# Patient Record
Sex: MALE | Race: WHITE | Employment: OTHER | URBAN - NONMETROPOLITAN AREA
[De-identification: names, ages, dates, MRNs, and addresses within clinical notes are randomized per-mention and may not be internally consistent; named-entity substitution may affect disease eponyms.]

---

## 2018-04-06 ENCOUNTER — HOSPITAL ENCOUNTER (EMERGENCY)
Age: 78
Discharge: HOME | End: 2018-04-06
Attending: EMERGENCY MEDICINE
Payer: MEDICARE

## 2018-04-06 VITALS
HEIGHT: 69 IN | RESPIRATION RATE: 18 BRPM | DIASTOLIC BLOOD PRESSURE: 76 MMHG | HEART RATE: 92 BPM | OXYGEN SATURATION: 98 % | WEIGHT: 264 LBS | TEMPERATURE: 98.5 F | BODY MASS INDEX: 39.1 KG/M2 | SYSTOLIC BLOOD PRESSURE: 132 MMHG

## 2018-04-06 DIAGNOSIS — J01.00 ACUTE NON-RECURRENT MAXILLARY SINUSITIS: Primary | ICD-10-CM

## 2018-04-06 DIAGNOSIS — J06.9 ACUTE UPPER RESPIRATORY INFECTION: ICD-10-CM

## 2018-04-06 PROCEDURE — 99282 EMERGENCY DEPT VISIT SF MDM: CPT

## 2018-04-06 RX ORDER — AMOXICILLIN AND CLAVULANATE POTASSIUM 875; 125 MG/1; MG/1
1 TABLET, FILM COATED ORAL 2 TIMES DAILY
Qty: 14 TABLET | Refills: 0 | Status: SHIPPED | OUTPATIENT
Start: 2018-04-06 | End: 2018-04-13

## 2018-04-06 ASSESSMENT — PAIN DESCRIPTION - FREQUENCY
FREQUENCY: CONTINUOUS
FREQUENCY: INTERMITTENT

## 2018-04-06 ASSESSMENT — PAIN DESCRIPTION - DESCRIPTORS
DESCRIPTORS: ACHING
DESCRIPTORS: DISCOMFORT;HEADACHE;SORE

## 2018-04-06 ASSESSMENT — PAIN DESCRIPTION - LOCATION
LOCATION: HEAD;THROAT
LOCATION: HEAD

## 2018-04-06 ASSESSMENT — ENCOUNTER SYMPTOMS
RHINORRHEA: 1
WHEEZING: 0
SORE THROAT: 0
SINUS PAIN: 1
SWOLLEN GLANDS: 0
COUGH: 0

## 2018-04-06 ASSESSMENT — PAIN DESCRIPTION - PROGRESSION
CLINICAL_PROGRESSION: GRADUALLY IMPROVING
CLINICAL_PROGRESSION: NOT CHANGED

## 2018-04-06 ASSESSMENT — PAIN DESCRIPTION - ONSET
ONSET: GRADUAL
ONSET: ON-GOING

## 2018-04-06 ASSESSMENT — PAIN DESCRIPTION - PAIN TYPE
TYPE: ACUTE PAIN
TYPE: ACUTE PAIN

## 2018-04-06 ASSESSMENT — PAIN SCALES - GENERAL
PAINLEVEL_OUTOF10: 4
PAINLEVEL_OUTOF10: 6

## 2018-04-06 ASSESSMENT — PAIN DESCRIPTION - ORIENTATION: ORIENTATION: ANTERIOR

## 2023-11-03 ENCOUNTER — APPOINTMENT (OUTPATIENT)
Dept: RADIOLOGY | Facility: HOSPITAL | Age: 83
End: 2023-11-03
Payer: MEDICARE

## 2023-11-27 ENCOUNTER — APPOINTMENT (OUTPATIENT)
Dept: RADIOLOGY | Facility: HOSPITAL | Age: 83
End: 2023-11-27
Payer: MEDICARE

## 2023-12-26 ENCOUNTER — APPOINTMENT (OUTPATIENT)
Dept: CARDIOLOGY | Facility: HOSPITAL | Age: 83
DRG: 189 | End: 2023-12-26
Payer: MEDICARE

## 2023-12-26 ENCOUNTER — HOSPITAL ENCOUNTER (OUTPATIENT)
Facility: HOSPITAL | Age: 83
Discharge: HOME | DRG: 189 | End: 2023-12-27
Attending: INTERNAL MEDICINE | Admitting: INTERNAL MEDICINE
Payer: MEDICARE

## 2023-12-26 DIAGNOSIS — I35.0 MODERATE AORTIC STENOSIS: ICD-10-CM

## 2023-12-26 DIAGNOSIS — I50.9 DECOMPENSATED HEART FAILURE (MULTI): Primary | ICD-10-CM

## 2023-12-26 PROBLEM — B33.8 RSV (RESPIRATORY SYNCYTIAL VIRUS INFECTION): Status: ACTIVE | Noted: 2023-12-26

## 2023-12-26 PROBLEM — J96.01 ACUTE HYPOXIC RESPIRATORY FAILURE (MULTI): Status: ACTIVE | Noted: 2023-12-26

## 2023-12-26 LAB — GLUCOSE BLD MANUAL STRIP-MCNC: 137 MG/DL (ref 74–99)

## 2023-12-26 PROCEDURE — 82947 ASSAY GLUCOSE BLOOD QUANT: CPT

## 2023-12-26 PROCEDURE — 99223 1ST HOSP IP/OBS HIGH 75: CPT | Performed by: NURSE PRACTITIONER

## 2023-12-26 PROCEDURE — 1200000002 HC GENERAL ROOM WITH TELEMETRY DAILY

## 2023-12-26 PROCEDURE — 2500000001 HC RX 250 WO HCPCS SELF ADMINISTERED DRUGS (ALT 637 FOR MEDICARE OP): Performed by: NURSE PRACTITIONER

## 2023-12-26 PROCEDURE — 93005 ELECTROCARDIOGRAM TRACING: CPT

## 2023-12-26 RX ORDER — DEXTROSE 50 % IN WATER (D50W) INTRAVENOUS SYRINGE
25
Status: DISCONTINUED | OUTPATIENT
Start: 2023-12-26 | End: 2023-12-27 | Stop reason: HOSPADM

## 2023-12-26 RX ORDER — GUAIFENESIN 600 MG/1
1200 TABLET, EXTENDED RELEASE ORAL DAILY
Status: DISCONTINUED | OUTPATIENT
Start: 2023-12-27 | End: 2023-12-27 | Stop reason: HOSPADM

## 2023-12-26 RX ORDER — PANTOPRAZOLE SODIUM 20 MG/1
20 TABLET, DELAYED RELEASE ORAL
Status: DISCONTINUED | OUTPATIENT
Start: 2023-12-27 | End: 2023-12-27 | Stop reason: HOSPADM

## 2023-12-26 RX ORDER — IPRATROPIUM BROMIDE AND ALBUTEROL SULFATE 2.5; .5 MG/3ML; MG/3ML
3 SOLUTION RESPIRATORY (INHALATION) 3 TIMES DAILY
Status: DISCONTINUED | OUTPATIENT
Start: 2023-12-26 | End: 2023-12-26

## 2023-12-26 RX ORDER — DEXTROSE MONOHYDRATE 100 MG/ML
0.3 INJECTION, SOLUTION INTRAVENOUS ONCE AS NEEDED
Status: DISCONTINUED | OUTPATIENT
Start: 2023-12-26 | End: 2023-12-27 | Stop reason: HOSPADM

## 2023-12-26 RX ORDER — IPRATROPIUM BROMIDE AND ALBUTEROL SULFATE 2.5; .5 MG/3ML; MG/3ML
3 SOLUTION RESPIRATORY (INHALATION)
Status: DISCONTINUED | OUTPATIENT
Start: 2023-12-27 | End: 2023-12-27 | Stop reason: HOSPADM

## 2023-12-26 RX ORDER — METOPROLOL TARTRATE 25 MG/1
25 TABLET, FILM COATED ORAL DAILY
Status: DISCONTINUED | OUTPATIENT
Start: 2023-12-27 | End: 2023-12-27 | Stop reason: HOSPADM

## 2023-12-26 RX ORDER — FUROSEMIDE 40 MG/1
40 TABLET ORAL DAILY
Status: DISCONTINUED | OUTPATIENT
Start: 2023-12-27 | End: 2023-12-27 | Stop reason: HOSPADM

## 2023-12-26 RX ORDER — ACETAMINOPHEN 325 MG/1
650 TABLET ORAL EVERY 6 HOURS PRN
Status: DISCONTINUED | OUTPATIENT
Start: 2023-12-26 | End: 2023-12-27 | Stop reason: HOSPADM

## 2023-12-26 RX ORDER — WARFARIN 3 MG/1
6 TABLET ORAL DAILY
Status: DISCONTINUED | OUTPATIENT
Start: 2023-12-27 | End: 2023-12-27 | Stop reason: HOSPADM

## 2023-12-26 RX ORDER — DOCUSATE SODIUM 100 MG/1
100 CAPSULE, LIQUID FILLED ORAL 2 TIMES DAILY
Status: DISCONTINUED | OUTPATIENT
Start: 2023-12-26 | End: 2023-12-27 | Stop reason: HOSPADM

## 2023-12-26 RX ORDER — CALCIUM CARBONATE 300MG(750)
400 TABLET,CHEWABLE ORAL DAILY
COMMUNITY

## 2023-12-26 RX ORDER — ALBUTEROL SULFATE 0.83 MG/ML
2.5 SOLUTION RESPIRATORY (INHALATION) EVERY 6 HOURS PRN
Status: DISCONTINUED | OUTPATIENT
Start: 2023-12-26 | End: 2023-12-26

## 2023-12-26 RX ORDER — ALBUTEROL SULFATE 0.83 MG/ML
2.5 SOLUTION RESPIRATORY (INHALATION) EVERY 2 HOUR PRN
Status: DISCONTINUED | OUTPATIENT
Start: 2023-12-26 | End: 2023-12-27 | Stop reason: HOSPADM

## 2023-12-26 RX ORDER — WARFARIN 6 MG/1
6 TABLET ORAL
COMMUNITY
End: 2024-01-19 | Stop reason: ENTERED-IN-ERROR

## 2023-12-26 RX ORDER — OMEPRAZOLE 40 MG/1
20 CAPSULE, DELAYED RELEASE ORAL
COMMUNITY

## 2023-12-26 RX ORDER — METFORMIN HYDROCHLORIDE EXTENDED-RELEASE TABLETS 500 MG/1
500 TABLET, FILM COATED, EXTENDED RELEASE ORAL
COMMUNITY

## 2023-12-26 RX ORDER — WARFARIN 3 MG/1
9 TABLET ORAL ONCE
Status: DISCONTINUED | OUTPATIENT
Start: 2023-12-26 | End: 2023-12-26

## 2023-12-26 RX ORDER — METOPROLOL TARTRATE 25 MG/1
25 TABLET, FILM COATED ORAL DAILY
COMMUNITY

## 2023-12-26 RX ORDER — INSULIN LISPRO 100 [IU]/ML
0-5 INJECTION, SOLUTION INTRAVENOUS; SUBCUTANEOUS 4 TIMES DAILY
Status: DISCONTINUED | OUTPATIENT
Start: 2023-12-26 | End: 2023-12-27 | Stop reason: HOSPADM

## 2023-12-26 RX ORDER — FUROSEMIDE 40 MG/1
40 TABLET ORAL DAILY
COMMUNITY

## 2023-12-26 RX ORDER — LANOLIN ALCOHOL/MO/W.PET/CERES
400 CREAM (GRAM) TOPICAL DAILY
Status: DISCONTINUED | OUTPATIENT
Start: 2023-12-27 | End: 2023-12-27 | Stop reason: HOSPADM

## 2023-12-26 RX ADMIN — DOCUSATE SODIUM 100 MG: 100 CAPSULE, LIQUID FILLED ORAL at 21:53

## 2023-12-26 SDOH — ECONOMIC STABILITY: INCOME INSECURITY: HOW HARD IS IT FOR YOU TO PAY FOR THE VERY BASICS LIKE FOOD, HOUSING, MEDICAL CARE, AND HEATING?: NOT HARD AT ALL

## 2023-12-26 SDOH — ECONOMIC STABILITY: HOUSING INSECURITY
IN THE LAST 12 MONTHS, WAS THERE A TIME WHEN YOU DID NOT HAVE A STEADY PLACE TO SLEEP OR SLEPT IN A SHELTER (INCLUDING NOW)?: NO

## 2023-12-26 SDOH — SOCIAL STABILITY: SOCIAL INSECURITY: HAVE YOU HAD THOUGHTS OF HARMING ANYONE ELSE?: NO

## 2023-12-26 SDOH — SOCIAL STABILITY: SOCIAL INSECURITY: WERE YOU ABLE TO COMPLETE ALL THE BEHAVIORAL HEALTH SCREENINGS?: YES

## 2023-12-26 SDOH — SOCIAL STABILITY: SOCIAL INSECURITY: ABUSE: ADULT

## 2023-12-26 SDOH — HEALTH STABILITY: MENTAL HEALTH: HOW OFTEN DO YOU HAVE 6 OR MORE DRINKS ON ONE OCCASION?: NEVER

## 2023-12-26 SDOH — SOCIAL STABILITY: SOCIAL INSECURITY: ARE THERE ANY APPARENT SIGNS OF INJURIES/BEHAVIORS THAT COULD BE RELATED TO ABUSE/NEGLECT?: NO

## 2023-12-26 SDOH — HEALTH STABILITY: MENTAL HEALTH: HOW MANY STANDARD DRINKS CONTAINING ALCOHOL DO YOU HAVE ON A TYPICAL DAY?: PATIENT DOES NOT DRINK

## 2023-12-26 SDOH — SOCIAL STABILITY: SOCIAL INSECURITY: DO YOU FEEL UNSAFE GOING BACK TO THE PLACE WHERE YOU ARE LIVING?: NO

## 2023-12-26 SDOH — ECONOMIC STABILITY: INCOME INSECURITY: IN THE LAST 12 MONTHS, WAS THERE A TIME WHEN YOU WERE NOT ABLE TO PAY THE MORTGAGE OR RENT ON TIME?: NO

## 2023-12-26 SDOH — ECONOMIC STABILITY: TRANSPORTATION INSECURITY
IN THE PAST 12 MONTHS, HAS LACK OF TRANSPORTATION KEPT YOU FROM MEETINGS, WORK, OR FROM GETTING THINGS NEEDED FOR DAILY LIVING?: NO

## 2023-12-26 SDOH — SOCIAL STABILITY: SOCIAL INSECURITY: DOES ANYONE TRY TO KEEP YOU FROM HAVING/CONTACTING OTHER FRIENDS OR DOING THINGS OUTSIDE YOUR HOME?: NO

## 2023-12-26 SDOH — ECONOMIC STABILITY: TRANSPORTATION INSECURITY
IN THE PAST 12 MONTHS, HAS THE LACK OF TRANSPORTATION KEPT YOU FROM MEDICAL APPOINTMENTS OR FROM GETTING MEDICATIONS?: NO

## 2023-12-26 SDOH — SOCIAL STABILITY: SOCIAL INSECURITY: HAS ANYONE EVER THREATENED TO HURT YOUR FAMILY OR YOUR PETS?: NO

## 2023-12-26 SDOH — HEALTH STABILITY: MENTAL HEALTH: HOW OFTEN DO YOU HAVE A DRINK CONTAINING ALCOHOL?: NEVER

## 2023-12-26 SDOH — SOCIAL STABILITY: SOCIAL INSECURITY: DO YOU FEEL ANYONE HAS EXPLOITED OR TAKEN ADVANTAGE OF YOU FINANCIALLY OR OF YOUR PERSONAL PROPERTY?: NO

## 2023-12-26 SDOH — SOCIAL STABILITY: SOCIAL INSECURITY: ARE YOU OR HAVE YOU BEEN THREATENED OR ABUSED PHYSICALLY, EMOTIONALLY, OR SEXUALLY BY ANYONE?: NO

## 2023-12-26 SDOH — ECONOMIC STABILITY: HOUSING INSECURITY: IN THE LAST 12 MONTHS, HOW MANY PLACES HAVE YOU LIVED?: 1

## 2023-12-26 ASSESSMENT — PAIN - FUNCTIONAL ASSESSMENT: PAIN_FUNCTIONAL_ASSESSMENT: 0-10

## 2023-12-26 ASSESSMENT — COGNITIVE AND FUNCTIONAL STATUS - GENERAL
PATIENT BASELINE BEDBOUND: NO
MOBILITY SCORE: 23
DAILY ACTIVITIY SCORE: 24
CLIMB 3 TO 5 STEPS WITH RAILING: A LITTLE

## 2023-12-26 ASSESSMENT — ENCOUNTER SYMPTOMS
HEADACHES: 0
SORE THROAT: 0
WOUND: 0
RHINORRHEA: 0
CONSTIPATION: 0
CHILLS: 1
MYALGIAS: 1
ACTIVITY CHANGE: 0
CONFUSION: 0
WHEEZING: 1
DIZZINESS: 0
BACK PAIN: 0
ABDOMINAL PAIN: 0
EYE ITCHING: 0
SHORTNESS OF BREATH: 1
FEVER: 0
AGITATION: 0
ARTHRALGIAS: 1
POLYDIPSIA: 0
APPETITE CHANGE: 0
DYSURIA: 0
EYE REDNESS: 0

## 2023-12-26 ASSESSMENT — ACTIVITIES OF DAILY LIVING (ADL)
BATHING: INDEPENDENT
ADEQUATE_TO_COMPLETE_ADL: NO
HEARING - RIGHT EAR: DIFFICULTY WITH NOISE
DRESSING YOURSELF: INDEPENDENT
WALKS IN HOME: INDEPENDENT
PATIENT'S MEMORY ADEQUATE TO SAFELY COMPLETE DAILY ACTIVITIES?: NO
FEEDING YOURSELF: INDEPENDENT
HEARING - LEFT EAR: DIFFICULTY WITH NOISE
TOILETING: INDEPENDENT
GROOMING: INDEPENDENT
LACK_OF_TRANSPORTATION: NO
JUDGMENT_ADEQUATE_SAFELY_COMPLETE_DAILY_ACTIVITIES: NO

## 2023-12-26 ASSESSMENT — COLUMBIA-SUICIDE SEVERITY RATING SCALE - C-SSRS
6. HAVE YOU EVER DONE ANYTHING, STARTED TO DO ANYTHING, OR PREPARED TO DO ANYTHING TO END YOUR LIFE?: NO
2. HAVE YOU ACTUALLY HAD ANY THOUGHTS OF KILLING YOURSELF?: NO
1. IN THE PAST MONTH, HAVE YOU WISHED YOU WERE DEAD OR WISHED YOU COULD GO TO SLEEP AND NOT WAKE UP?: NO

## 2023-12-26 ASSESSMENT — LIFESTYLE VARIABLES
HOW OFTEN DO YOU HAVE 6 OR MORE DRINKS ON ONE OCCASION: NEVER
HOW MANY STANDARD DRINKS CONTAINING ALCOHOL DO YOU HAVE ON A TYPICAL DAY: PATIENT DOES NOT DRINK
SKIP TO QUESTIONS 9-10: 1
AUDIT-C TOTAL SCORE: 0
SKIP TO QUESTIONS 9-10: 1
HOW OFTEN DO YOU HAVE A DRINK CONTAINING ALCOHOL: NEVER
AUDIT-C TOTAL SCORE: 0
AUDIT-C TOTAL SCORE: 0

## 2023-12-26 ASSESSMENT — PATIENT HEALTH QUESTIONNAIRE - PHQ9
SUM OF ALL RESPONSES TO PHQ9 QUESTIONS 1 & 2: 0
2. FEELING DOWN, DEPRESSED OR HOPELESS: NOT AT ALL
1. LITTLE INTEREST OR PLEASURE IN DOING THINGS: NOT AT ALL

## 2023-12-26 ASSESSMENT — PAIN SCALES - GENERAL: PAINLEVEL_OUTOF10: 0 - NO PAIN

## 2023-12-27 VITALS
SYSTOLIC BLOOD PRESSURE: 116 MMHG | TEMPERATURE: 96.4 F | OXYGEN SATURATION: 92 % | DIASTOLIC BLOOD PRESSURE: 74 MMHG | WEIGHT: 222.44 LBS | HEIGHT: 69 IN | BODY MASS INDEX: 32.95 KG/M2 | RESPIRATION RATE: 20 BRPM | HEART RATE: 81 BPM

## 2023-12-27 LAB
ANION GAP SERPL CALC-SCNC: 10 MMOL/L (ref 10–20)
BNP SERPL-MCNC: 87 PG/ML (ref 0–99)
BUN SERPL-MCNC: 20 MG/DL (ref 6–23)
CALCIUM SERPL-MCNC: 8.5 MG/DL (ref 8.6–10.3)
CARDIAC TROPONIN I PNL SERPL HS: 16 NG/L (ref 0–20)
CHLORIDE SERPL-SCNC: 101 MMOL/L (ref 98–107)
CO2 SERPL-SCNC: 30 MMOL/L (ref 21–32)
CREAT SERPL-MCNC: 1.12 MG/DL (ref 0.5–1.3)
ERYTHROCYTE [DISTWIDTH] IN BLOOD BY AUTOMATED COUNT: 14.7 % (ref 11.5–14.5)
GFR SERPL CREATININE-BSD FRML MDRD: 65 ML/MIN/1.73M*2
GLUCOSE BLD MANUAL STRIP-MCNC: 102 MG/DL (ref 74–99)
GLUCOSE BLD MANUAL STRIP-MCNC: 80 MG/DL (ref 74–99)
GLUCOSE SERPL-MCNC: 92 MG/DL (ref 74–99)
HCT VFR BLD AUTO: 44.2 % (ref 41–52)
HGB BLD-MCNC: 14.3 G/DL (ref 13.5–17.5)
INR PPP: 1.5 (ref 0.9–1.1)
MCH RBC QN AUTO: 30.1 PG (ref 26–34)
MCHC RBC AUTO-ENTMCNC: 32.4 G/DL (ref 32–36)
MCV RBC AUTO: 93 FL (ref 80–100)
NRBC BLD-RTO: 0 /100 WBCS (ref 0–0)
PLATELET # BLD AUTO: 199 X10*3/UL (ref 150–450)
POTASSIUM SERPL-SCNC: 3.6 MMOL/L (ref 3.5–5.3)
PROTHROMBIN TIME: 16.5 SECONDS (ref 9.8–12.8)
RBC # BLD AUTO: 4.75 X10*6/UL (ref 4.5–5.9)
SODIUM SERPL-SCNC: 137 MMOL/L (ref 136–145)
WBC # BLD AUTO: 8.4 X10*3/UL (ref 4.4–11.3)

## 2023-12-27 PROCEDURE — 85610 PROTHROMBIN TIME: CPT | Performed by: NURSE PRACTITIONER

## 2023-12-27 PROCEDURE — 36415 COLL VENOUS BLD VENIPUNCTURE: CPT | Performed by: NURSE PRACTITIONER

## 2023-12-27 PROCEDURE — 94761 N-INVAS EAR/PLS OXIMETRY MLT: CPT

## 2023-12-27 PROCEDURE — 9420000001 HC RT PATIENT EDUCATION 5 MIN

## 2023-12-27 PROCEDURE — 85027 COMPLETE CBC AUTOMATED: CPT | Performed by: NURSE PRACTITIONER

## 2023-12-27 PROCEDURE — 80048 BASIC METABOLIC PNL TOTAL CA: CPT | Performed by: NURSE PRACTITIONER

## 2023-12-27 PROCEDURE — 94640 AIRWAY INHALATION TREATMENT: CPT

## 2023-12-27 PROCEDURE — 2500000004 HC RX 250 GENERAL PHARMACY W/ HCPCS (ALT 636 FOR OP/ED): Performed by: NURSE PRACTITIONER

## 2023-12-27 PROCEDURE — 82947 ASSAY GLUCOSE BLOOD QUANT: CPT

## 2023-12-27 PROCEDURE — 99222 1ST HOSP IP/OBS MODERATE 55: CPT | Performed by: INTERNAL MEDICINE

## 2023-12-27 PROCEDURE — 83880 ASSAY OF NATRIURETIC PEPTIDE: CPT | Performed by: NURSE PRACTITIONER

## 2023-12-27 PROCEDURE — 2500000001 HC RX 250 WO HCPCS SELF ADMINISTERED DRUGS (ALT 637 FOR MEDICARE OP): Performed by: NURSE PRACTITIONER

## 2023-12-27 PROCEDURE — 84484 ASSAY OF TROPONIN QUANT: CPT | Performed by: NURSE PRACTITIONER

## 2023-12-27 PROCEDURE — 99239 HOSP IP/OBS DSCHRG MGMT >30: CPT

## 2023-12-27 PROCEDURE — 2500000002 HC RX 250 W HCPCS SELF ADMINISTERED DRUGS (ALT 637 FOR MEDICARE OP, ALT 636 FOR OP/ED): Mod: MUE | Performed by: INTERNAL MEDICINE

## 2023-12-27 RX ADMIN — ACETAMINOPHEN 650 MG: 325 TABLET ORAL at 09:13

## 2023-12-27 RX ADMIN — DOCUSATE SODIUM 100 MG: 100 CAPSULE, LIQUID FILLED ORAL at 09:13

## 2023-12-27 RX ADMIN — PANTOPRAZOLE SODIUM 20 MG: 20 TABLET, DELAYED RELEASE ORAL at 06:37

## 2023-12-27 RX ADMIN — Medication 400 MG: at 09:13

## 2023-12-27 RX ADMIN — GUAIFENESIN 1200 MG: 600 TABLET ORAL at 06:37

## 2023-12-27 RX ADMIN — FUROSEMIDE 40 MG: 40 TABLET ORAL at 09:00

## 2023-12-27 RX ADMIN — IPRATROPIUM BROMIDE AND ALBUTEROL SULFATE 3 ML: .5; 3 SOLUTION RESPIRATORY (INHALATION) at 13:40

## 2023-12-27 RX ADMIN — IPRATROPIUM BROMIDE AND ALBUTEROL SULFATE 3 ML: .5; 3 SOLUTION RESPIRATORY (INHALATION) at 07:48

## 2023-12-27 RX ADMIN — METOPROLOL TARTRATE 25 MG: 25 TABLET, FILM COATED ORAL at 09:13

## 2023-12-27 ASSESSMENT — COGNITIVE AND FUNCTIONAL STATUS - GENERAL
DAILY ACTIVITIY SCORE: 24
CLIMB 3 TO 5 STEPS WITH RAILING: A LITTLE
MOBILITY SCORE: 22
WALKING IN HOSPITAL ROOM: A LITTLE

## 2023-12-27 ASSESSMENT — PAIN SCALES - GENERAL: PAINLEVEL_OUTOF10: 0 - NO PAIN

## 2023-12-27 ASSESSMENT — ACTIVITIES OF DAILY LIVING (ADL): LACK_OF_TRANSPORTATION: NO

## 2023-12-27 NOTE — CONSULTS
"Consults fluid overload, last ECHO a few years ago showed worsening AS     History Of Present Illness:    Zackary Shah is a 83 y.o. male with a history of aFib on coumadin, moderate aortic stenosis in 2020. Admitted for Acute Hypoxic Respiratory Failure d/t RSV. He presented to [facility] for sudden onset shortness of breath, dry cough, chills, myalgias that started 2 days ago. He is now requiring 2L nasal cannula & normally does not wear oxygen.     In the ED at [facility], BNP 3140, Trop <0.012, INR 1.4 states compliance with medications. Echocardiogram done showing an EF 68%, left ventricle dilated, right atrial cavity dilation, there is paradoxical low flow, low gradient, moderate-severe aortic valve stenosis. He admits to shortness of breath, denies being short of breath until Saturday, no chest pain, swelling, heart palpations, dizziness or orthopnea.        Last Recorded Vitals:  Vitals:    12/26/23 1952 12/26/23 2045 12/27/23 0530 12/27/23 0748   BP:  135/78 111/65    Pulse:  110 79    Resp:  18 18    Temp:  36.2 °C (97.2 °F) 36 °C (96.8 °F)    TempSrc:  Temporal Temporal    SpO2:  96% 96% 97%   Weight: 101 kg (223 lb 12.3 oz) 101 kg (223 lb 12.3 oz) 101 kg (222 lb 7.1 oz)    Height:  1.753 m (5' 9\")         Last Labs:  CBC - 12/27/2023:  8:17 AM  8.4 14.3 199    44.2      CMP - 12/27/2023:  8:17 AM  8.5 _ _ --- _   _ _ _ _      PTT - No results in last year.  1.5   16.5 _     Troponin I, High Sensitivity   Date/Time Value Ref Range Status   12/27/2023 08:17 AM 16 0 - 20 ng/L Final     BNP   Date/Time Value Ref Range Status   12/27/2023 08:17 AM 87 0 - 99 pg/mL Final   08/28/2019 11:35  (H) 0 - 99 pg/mL Final     Comment:     .  <100 pg/mL - Heart failure unlikely  100-299 pg/mL - Intermediate probability of acute heart  .               failure exacerbation. Correlate with clinical  .               context and patient history.    >=300 pg/mL - Heart Failure likely. Correlate with clinical  .               " context and patient history.  BNP testing is performed using different testing   methodology at [facility] than at other   [facility] hospitals. Direct result comparisons should   only be made within the same method.       Hemoglobin A1C   Date/Time Value Ref Range Status   08/29/2019 05:51 AM 6.0 % Final     Comment:          Diagnosis of Diabetes-Adults   Non-Diabetic: < or = 5.6%   Increased risk for developing diabetes: 5.7-6.4%   Diagnostic of diabetes: > or = 6.5%  .       Monitoring of Diabetes                Age (y)     Therapeutic Goal (%)   Adults:          >18           <7.0   Pediatrics:    13-18           <7.5                   7-12           <8.0                   0- 6            7.5-8.5   American Diabetes Association. Diabetes Care 33(S1), Jan 2010.        Last I/O:  I/O last 3 completed shifts:  In: 240 (2.4 mL/kg) [P.O.:240]  Out: - (0 mL/kg)   Weight: 100.9 kg     Past Medical History:  He has a past medical history of Aortic stenosis, Atrial fibrillation (CMS/[facility]), CHF (congestive heart failure) (CMS/[facility]), Diabetes mellitus (CMS/[facility]), and GERD (gastroesophageal reflux disease).    Past Surgical History:  He has a past surgical history that includes Other surgical history (10/24/2019).      Social History:  He reports that he has quit smoking. His smoking use included cigarettes. He has never used smokeless tobacco. He reports that he does not currently use alcohol. He reports that he does not currently use drugs.    Family History:  No family history on file.     Allergies:  Codeine    Inpatient Medications:  Scheduled medications   Medication Dose Route Frequency    docusate sodium  100 mg oral BID    furosemide  40 mg oral Daily    guaiFENesin  1,200 mg oral Daily    insulin lispro  0-5 Units subcutaneous 4x daily    ipratropium-albuteroL  3 mL nebulization TID    magnesium oxide  400 mg oral Daily    metoprolol tartrate  25 mg oral Daily    pantoprazole  20 mg oral Daily before  breakfast    psyllium  1 packet oral Daily    warfarin  6 mg oral Daily     PRN medications   Medication    acetaminophen    albuterol    dextrose 10 % in water (D10W)    dextrose    glucagon    oxygen     Continuous Medications   Medication Dose Last Rate     Outpatient Medications:  Current Outpatient Medications   Medication Instructions    furosemide (LASIX) 40 mg, oral, Daily    magnesium oxide (MAG-OX) 400 mg, Daily    metFORMIN (OSM) (FORTAMET) 500 mg, oral, Do not crush, chew, or split.    metoprolol tartrate (LOPRESSOR) 25 mg, oral, Daily    omeprazole (PRILOSEC) 20 mg, oral, Daily before breakfast, Do not crush or chew.    psyllium (Metamucil) 3.4 gram packet 1 packet, oral, Daily    warfarin (COUMADIN) 6 mg, oral, Take as directed per After Visit Summary.       Physical Exam  Vitals reviewed.   HENT:      Head: Normocephalic.      Nose: Nose normal.   Eyes:      Pupils: Pupils are equal, round, and reactive to light.   Cardiovascular:      Rate and Rhythm: irregular, irregular, normal rate   Pulmonary:      Effort: Pulmonary effort is normal.      Breath sounds: Diminished, wheezing   Abdominal:      General: Abdomen is flat.      Palpations: Abdomen is soft.   Musculoskeletal:         General: Normal range of motion.      Cervical back: Normal range of motion.   Skin:     General: Skin is warm and dry.   Neurological:      General: No focal deficit present.      Mental Status: He is alert and oriented to person, place, and time.   Psychiatric:         Mood and Affect: Mood normal.         Behavior: Behavior normal.        Assessment/Plan   Consult: fluid overload, last ECHO a few years ago showed worsening AS     Zackary Shah is a 83 y.o. male with a history of aFib on coumadin, moderate aortic stenosis in 2020. Admitted for Acute Hypoxic Respiratory Failure d/t RSV.    He is euvolemic on exam, no JVD or BLE edema. BNP likely elevated d/t inflammation. Lung sounds diminished and wheezing. I will have  him follow up with [facility] in 4-6 weeks to follow AS.     #Aortic Stenosis  #Elevated BNP      Plan:  Continue home medications  Metoprolol Tartrate 25mg BID, Lasix 40mg daily  Follow up with Dr. Patrick for AS in 4-6 weeks   INR goal 2-3, primary team managing   Follow up with coumadin clinic         Lea Fleming, KARL-CNP

## 2023-12-27 NOTE — H&P
History Of Present Illness  Zackary Shah is a 83 y.o. male with a pertinent hx of AS, atrial fibrillation & diastolic heart failure (EF 68% on 12/26/23, diastolic function not evaluated, previous ECHO in 2020 showed EF 55% with impaired diastolic filling) who presented from [facility] today with worsening shortness of breath, dry cough, chills, myalgias that started 2 days ago.  Patient is requiring 2 to 4 L nasal cannula & normally does not wear oxygen.  Patient has been taking his Lasix daily as prescribed and denies missing any dosages.  He denies any ill contacts.  He denies chest pain, fever, weight changes or leg swelling. Patient requested to come to [facility] since previous cardiology evaluation was here with Dr. Patrick's team. Patient admits he was supposed to have an echocardiogram completed a few months ago and was unable to make the appointment.  Patient had a echocardiogram completed yesterday at [facility].  Labs from [facility] reviewed and pretty unremarkable except for positive lymphocytopenia and INR 1.4.  Chest x-ray from December 25 showed no cardiomegaly and no consolidation pleural effusion or pneumothorax. Some linear opacities in the lung bases.     Past Medical History  He has a past medical history of Aortic stenosis, Atrial fibrillation (CMS/[facility]), CHF (congestive heart failure) (CMS/[facility]), Diabetes mellitus (CMS/[facility]), and GERD (gastroesophageal reflux disease).    Surgical History  Cholecystectomy and appendectomy.     Social History  He reports that he has quit smoking over 50 years ago. His smoking use included cigarettes. He has never used smokeless tobacco. He reports that he does not currently use alcohol. He reports that he does not currently use drugs.    Family History  Son-pacemaker placement     Allergies  Codeine    Review of Systems   Constitutional:  Positive for chills. Negative for activity change, appetite change and fever.   HENT:  Negative for rhinorrhea and sore throat.    Eyes:   Negative for redness and itching.   Respiratory:  Positive for shortness of breath and wheezing.    Cardiovascular:  Negative for chest pain and leg swelling.   Gastrointestinal:  Negative for abdominal pain and constipation.   Endocrine: Negative for polydipsia and polyuria.   Genitourinary:  Negative for dysuria and urgency.   Musculoskeletal:  Positive for arthralgias and myalgias. Negative for back pain.   Skin:  Negative for rash and wound.   Neurological:  Negative for dizziness and headaches.   Psychiatric/Behavioral:  Negative for agitation, behavioral problems and confusion.         Physical Exam  Constitutional:       General: He is not in acute distress.     Appearance: He is obese. He is not ill-appearing, toxic-appearing or diaphoretic.   HENT:      Head: Normocephalic and atraumatic.      Mouth/Throat:      Mouth: Mucous membranes are moist.      Pharynx: Oropharynx is clear.   Eyes:      Extraocular Movements: Extraocular movements intact.      Conjunctiva/sclera: Conjunctivae normal.   Cardiovascular:      Rate and Rhythm: Normal rate. Rhythm irregular.      Pulses: Normal pulses.      Heart sounds: Normal heart sounds. No murmur heard.     Comments: NO JVD  Pulmonary:      Effort: Pulmonary effort is normal. No respiratory distress.      Breath sounds: Wheezing present. No rhonchi.      Comments: No use of accessory muscles  Abdominal:      General: Bowel sounds are normal. There is no distension.      Palpations: Abdomen is soft.      Tenderness: There is no abdominal tenderness. There is no guarding.      Hernia: A hernia is present.      Comments: Reducible hernia+   Musculoskeletal:         General: No swelling. Normal range of motion.      Cervical back: Normal range of motion.      Right lower leg: No edema.      Left lower leg: No edema.   Skin:     General: Skin is dry.      Coloration: Skin is pale.      Findings: No erythema or rash.   Neurological:      Mental Status: He is alert and  oriented to person, place, and time. Mental status is at baseline.      Motor: No weakness.   Psychiatric:         Mood and Affect: Mood normal.         Behavior: Behavior normal.       Last Recorded Vitals  There were no vitals taken for this visit.    Relevant Results  Scheduled medications  docusate sodium, 100 mg, oral, BID  perflutren lipid microspheres, 0.5-10 mL of dilution, intravenous, Once in imaging  perflutren protein A microsphere, 0.5 mL, intravenous, Once in imaging  sulfur hexafluoride microsphr, 2 mL, intravenous, Once in imaging      Continuous medications     PRN medications  PRN medications: acetaminophen, oxygen    No results found for this or any previous visit (from the past 24 hour(s)).     XR CHEST 1V FRONTAL    Result Date: 12/25/2023  * * *Final Report* * * DATE OF EXAM: Dec 25 2023  6:22PM   ASX   5290  -  XR CHEST 1V FRONTAL   / ACCESSION #  [id number] PROCEDURE REASON: Shortness of breath      * * * * Physician Interpretation * * * *  EXAMINATION:  CHEST RADIOGRAPH (SINGLE VIEW AP OR PA) CLINICAL HISTORY: Shortness of breath MQ:  XC1_5 Comparison:  None RESULT: Lines, tubes, and devices:  None. Lungs and pleura:  Linear opacities present at the lung bases.  No consolidation. No pleural effusion or pneumothorax. Cardiomediastinal silhouette:  Normal cardiomediastinal silhouette. Other:  .    IMPRESSION: Bibasilar subsegmental atelectasis.  No consolidation. : JOE   Transcribe Date/Time: Dec 25 2023  6:46P Dictated by : CUBA KOTHARI MD This examination was interpreted and the report reviewed and electronically signed by: CUBA KOTHARI MD on Dec 25 2023  6:47PM  EST       Assessment/Plan   Principal Problem:  Acute Hypoxic Respiratory Failure d/t RSV  Breathing treatments/Mucinex  Wean oxygen  Pulse ox  Supportive care    Subtherapeutic INR  -Received 10 mg coumadin today at noon  Restart daily 6 mg tomorrow  Monitor INR    Heart failure (CMS/HCC)/Moderate Severe Aortic Stenosis  -Not  convinced for decompensation on exam  Daily wt/Strict I's/O's/Low salt diet  ECHO completed at [facility] and showed mod severe aortic stenosis, may need further evaluation  Cardiology consulted-appreciate rec  Continue lasix metoprolol  EKG/repeat troponin/BNP  Tele    NIDDM   -last A1C 6.0  BSG controlled  Hold metformin while inpatient  Start sliding scale and accucheck while inpatient    DVT PX  SCDs  Coumadin    Kimberley Guerrier, APRN-CNP

## 2023-12-27 NOTE — DISCHARGE SUMMARY
Discharge Diagnosis  Decompensated heart failure (CMS/HCC)    Issues Requiring Follow-Up  Please see PCP in 2 weeks for after hospital discharge and to evaluate oxygen status.     Discharge Meds     Your medication list        CONTINUE taking these medications        Instructions Last Dose Given Next Dose Due   furosemide 40 mg tablet  Commonly known as: Lasix           magnesium oxide 400 mg tablet  Commonly known as: Mag-Ox           metFORMIN (OSM) 500 mg 24 hr tablet  Commonly known as: Fortamet           metoprolol tartrate 25 mg tablet  Commonly known as: Lopressor           omeprazole 40 mg DR capsule  Commonly known as: PriLOSEC           psyllium 3.4 gram packet  Commonly known as: Metamucil           warfarin 6 mg tablet  Commonly known as: Coumadin                          Hospital Course  Zackary Shah is a 83 y.o. male with a pertinent hx of AS, atrial fibrillation & diastolic heart failure (EF 68% on 12/26/23, diastolic function not evaluated, previous ECHO in 2020 showed EF 55% with impaired diastolic filling, aortic stenosis) who presented from [facility] today with C/O worsening shortness of breath, dry cough, chills, myalgias x 2 days ago.    In the [facility] ED pt needed 2 L of oxygen SpO2 95%, tachypneic 29, but afebrile.  Pt had elevated BNP, no leukocytosis, COVID and influenza negative but RSV positive. Chest x-ray showed bilateral subsegmental atelectasis, no consolidation.    He was managed with lasix, solu-medrol .   Daughter was concerned about heart failure and  requested to come to [facility] since previous cardiology evaluation was with Dr. Patrick's team in [facility], hence pt was transferred. ECHO 12/26/23 showed LVEF 68%, diastolic dysfunction and moderately severe aortic stenosis. Cardiology evaluated the pt and recommended out-pt follow-up for his aortic stenosis. His oxygen needs resolved and pt is currently on room air, walking pulse also maintained >92% spo2. Pt will be discharged home  today.     Pertinent Physical Exam At Time of Discharge  Physical Exam  GE: lying comfortable in bed, in NAD  HEENT: AT/NC, no conjunctival pallor, anicteric sclera, moist mucous membrane  Neck; supple neck, no LAD/JVD  Chest: B/L mild wheezing,  no adventitious sounds heard, normal rise and fall of thoracic cavity during each respiratory cycle.  CVS: s1 and s2, no MGR, RRR  Abd: soft, NT/ND, +BS, no organomegaly, No guarding/rebound tenderness  Ext: no pedal/edema/cyanosis  Neuro: Aox3, Cn 2-12 intact, sensation intact, Motor 5/5 globally  Psych: normal mood/affect.        Outpatient Follow-Up  Future Appointments   Date Time Provider Department Center   2/14/2024 10:40 AM Tommie Patrick MD [id number]         Angelika Walker MD

## 2023-12-27 NOTE — CARE PLAN
The patient's goals for the shift include      The clinical goals for the shift include Decrease O2 demand      Problem: Pain - Adult  Goal: Verbalizes/displays adequate comfort level or baseline comfort level  Outcome: Progressing     Problem: Safety - Adult  Goal: Free from fall injury  Outcome: Progressing     Problem: Discharge Planning  Goal: Discharge to home or other facility with appropriate resources  Outcome: Progressing     Problem: Chronic Conditions and Co-morbidities  Goal: Patient's chronic conditions and co-morbidity symptoms are monitored and maintained or improved  Outcome: Progressing     Problem: Diabetes  Goal: Achieve decreasing blood glucose levels by end of shift  Outcome: Progressing  Goal: Increase stability of blood glucose readings by end of shift  Outcome: Progressing  Goal: Decrease in ketones present in urine by end of shift  Outcome: Progressing  Goal: Maintain electrolyte levels within acceptable range throughout shift  Outcome: Progressing  Goal: Maintain glucose levels >70mg/dl to <250mg/dl throughout shift  Outcome: Progressing  Goal: No changes in neurological exam by end of shift  Outcome: Progressing  Goal: Learn about and adhere to nutrition recommendations by end of shift  Outcome: Progressing  Goal: Vital signs within normal range for age by end of shift  Outcome: Progressing  Goal: Increase self care and/or family involovement by end of shift  Outcome: Progressing  Goal: Receive DSME education by end of shift  Outcome: Progressing     Problem: Heart Failure  Goal: Improved gas exchange this shift  Outcome: Progressing  Goal: Improved urinary output this shift  Outcome: Progressing  Goal: Reduction in peripheral edema within 24 hours  Outcome: Progressing  Goal: Report improvement of dyspnea/breathlessness this shift  Outcome: Progressing  Goal: Weight from fluid excess reduced over 2-3 days, then stabilize  Outcome: Progressing  Goal: Increase self care and/or family  involvement in 24 hours  Outcome: Progressing     Problem: Fall/Injury  Goal: Not fall by end of shift  Outcome: Progressing  Goal: Be free from injury by end of the shift  Outcome: Progressing  Goal: Verbalize understanding of personal risk factors for fall in the hospital  Outcome: Progressing  Goal: Verbalize understanding of risk factor reduction measures to prevent injury from fall in the home  Outcome: Progressing  Goal: Use assistive devices by end of the shift  Outcome: Progressing  Goal: Pace activities to prevent fatigue by end of the shift  Outcome: Progressing

## 2023-12-27 NOTE — HOSPITAL COURSE
Zackary Shah is a 83 y.o. male with a pertinent hx of AS, atrial fibrillation & diastolic heart failure (EF 68% on 12/26/23, diastolic function not evaluated, previous ECHO in 2020 showed EF 55% with impaired diastolic filling, aortic stenosis) who presented from [facility] today with C/O worsening shortness of breath, dry cough, chills, myalgias x 2 days ago.    In the [facility] ED pt needed 2 L of oxygen SpO2 95%, tachypneic 29, but afebrile.  Pt had elevated BNP, no leukocytosis, COVID and influenza negative but RSV positive. Chest x-ray showed bilateral subsegmental atelectasis, no consolidation.    He was managed with lasix, solu-medrol .   Daughter was concerned about heart failure and  requested to come to [facility] since previous cardiology evaluation was with Dr. Patrick's team in [facility], hence pt was transferred. ECHO 12/26/23 showed LVEF 68%, diastolic dysfunction and moderately severe aortic stenosis. Cardiology evaluated the pt and recommended out-pt follow-up for his aortic stenosis. His oxygen needs resolved and pt is currently on room air, walking pulse also maintained >92% spo2. Pt will be discharged home today.

## 2023-12-27 NOTE — PROGRESS NOTES
12/27/23 1016   Discharge Planning   Living Arrangements Alone   Support Systems Children  (son and daughter live near)   Assistance Needed A&Ox3; independent with ADLs no assistive devices; room air at baseline - currently on 2L NC; drives;no CPAP/BiPAP   Type of Residence Private residence   Number of Stairs to Enter Residence 3   Number of Stairs Within Residence 0   Do you have animals or pets at home? No   Who is requesting discharge planning? Provider   Patient expects to be discharged to: Home no needs. Patient currently on supplemental O2 @ 2L - will need to assess prior to dc   Does the patient need discharge transport arranged? Yes   RoundTrip coordination needed? Yes   Has discharge transport been arranged? No   Financial Resource Strain   How hard is it for you to pay for the very basics like food, housing, medical care, and heating? Not hard   Housing Stability   In the last 12 months, was there a time when you were not able to pay the mortgage or rent on time? N   In the last 12 months, how many places have you lived? 1   In the last 12 months, was there a time when you did not have a steady place to sleep or slept in a shelter (including now)? N   Transportation Needs   In the past 12 months, has lack of transportation kept you from medical appointments or from getting medications? no   In the past 12 months, has lack of transportation kept you from meetings, work, or from getting things needed for daily living? No     12/27/2023 1354pm  Made aware by medical team patient ready to discharge. Phoned patient's daughter Samantha (listed in Epic) and made her aware. Samantha is going to call family members to see who can pick patient up. Samantha made aware patient ready to dc today at 4pm. Medical team made  aware.     12/27/2023 1410pm Farhad Bunn RN PCN  Patient now on room air and ready to discharge to home. Spoke with patient and made him aware. Patient is reached out to family member 'Don' and has  arranged transport for approximately 4pm today.

## 2023-12-31 LAB
ATRIAL RATE: 119 BPM
Q ONSET: 221 MS
QRS COUNT: 15 BEATS
QRS DURATION: 82 MS
QT INTERVAL: 368 MS
QTC CALCULATION(BAZETT): 450 MS
QTC FREDERICIA: 421 MS
R AXIS: 47 DEGREES
T AXIS: 28 DEGREES
T OFFSET: 405 MS
VENTRICULAR RATE: 90 BPM

## 2024-01-01 ENCOUNTER — HOSPITAL ENCOUNTER (OUTPATIENT)
Dept: CARDIOLOGY | Facility: HOSPITAL | Age: 84
Discharge: HOME | End: 2024-01-01
Payer: MEDICARE

## 2024-01-01 ENCOUNTER — APPOINTMENT (OUTPATIENT)
Dept: RADIOLOGY | Facility: HOSPITAL | Age: 84
DRG: 193 | End: 2024-01-01
Payer: MEDICARE

## 2024-01-01 ENCOUNTER — HOSPITAL ENCOUNTER (INPATIENT)
Facility: HOSPITAL | Age: 84
LOS: 5 days | Discharge: HOME | DRG: 193 | End: 2024-01-06
Attending: STUDENT IN AN ORGANIZED HEALTH CARE EDUCATION/TRAINING PROGRAM | Admitting: INTERNAL MEDICINE
Payer: MEDICARE

## 2024-01-01 DIAGNOSIS — R09.02 HYPOXIA: Primary | ICD-10-CM

## 2024-01-01 DIAGNOSIS — U07.1 PNEUMONIA DUE TO 2019-NCOV: ICD-10-CM

## 2024-01-01 DIAGNOSIS — U07.1 COVID: ICD-10-CM

## 2024-01-01 DIAGNOSIS — I48.91 ATRIAL FIBRILLATION, UNSPECIFIED TYPE (MULTI): ICD-10-CM

## 2024-01-01 DIAGNOSIS — J12.82 PNEUMONIA DUE TO 2019-NCOV: ICD-10-CM

## 2024-01-01 LAB
ALBUMIN SERPL BCP-MCNC: 3.7 G/DL (ref 3.4–5)
ALP SERPL-CCNC: 91 U/L (ref 33–136)
ALT SERPL W P-5'-P-CCNC: 28 U/L (ref 10–52)
ANION GAP BLDV CALCULATED.4IONS-SCNC: 6 MMOL/L (ref 10–25)
ANION GAP SERPL CALC-SCNC: 18 MMOL/L (ref 10–20)
AST SERPL W P-5'-P-CCNC: 22 U/L (ref 9–39)
BASE EXCESS BLDV CALC-SCNC: 6.1 MMOL/L (ref -2–3)
BASOPHILS # BLD AUTO: 0.03 X10*3/UL (ref 0–0.1)
BASOPHILS NFR BLD AUTO: 0.2 %
BILIRUB SERPL-MCNC: 1.7 MG/DL (ref 0–1.2)
BNP SERPL-MCNC: 113 PG/ML (ref 0–99)
BODY TEMPERATURE: ABNORMAL
BUN SERPL-MCNC: 20 MG/DL (ref 6–23)
CA-I BLDV-SCNC: 1.12 MMOL/L (ref 1.1–1.33)
CALCIUM SERPL-MCNC: 8.7 MG/DL (ref 8.6–10.3)
CARDIAC TROPONIN I PNL SERPL HS: 13 NG/L (ref 0–20)
CHLORIDE BLDV-SCNC: 99 MMOL/L (ref 98–107)
CHLORIDE SERPL-SCNC: 99 MMOL/L (ref 98–107)
CO2 SERPL-SCNC: 28 MMOL/L (ref 21–32)
CREAT SERPL-MCNC: 1.16 MG/DL (ref 0.5–1.3)
EOSINOPHIL # BLD AUTO: 0.04 X10*3/UL (ref 0–0.4)
EOSINOPHIL NFR BLD AUTO: 0.3 %
ERYTHROCYTE [DISTWIDTH] IN BLOOD BY AUTOMATED COUNT: 14.2 % (ref 11.5–14.5)
GFR SERPL CREATININE-BSD FRML MDRD: 62 ML/MIN/1.73M*2
GLUCOSE BLDV-MCNC: 122 MG/DL (ref 74–99)
GLUCOSE SERPL-MCNC: 122 MG/DL (ref 74–99)
HCO3 BLDV-SCNC: 31.3 MMOL/L (ref 22–26)
HCT VFR BLD AUTO: 44.3 % (ref 41–52)
HCT VFR BLD EST: 65 % (ref 41–52)
HGB BLD-MCNC: 14.4 G/DL (ref 13.5–17.5)
HGB BLDV-MCNC: 21.6 G/DL (ref 13.5–17.5)
IMM GRANULOCYTES # BLD AUTO: 0.08 X10*3/UL (ref 0–0.5)
IMM GRANULOCYTES NFR BLD AUTO: 0.6 % (ref 0–0.9)
INHALED O2 CONCENTRATION: 62 %
INR PPP: 1.8 (ref 0.9–1.1)
LACTATE BLDV-SCNC: 2.6 MMOL/L (ref 0.4–2)
LACTATE SERPL-SCNC: 1.6 MMOL/L (ref 0.4–2)
LYMPHOCYTES # BLD AUTO: 1.49 X10*3/UL (ref 0.8–3)
LYMPHOCYTES NFR BLD AUTO: 11.3 %
MAGNESIUM SERPL-MCNC: 2.18 MG/DL (ref 1.6–2.4)
MCH RBC QN AUTO: 30.3 PG (ref 26–34)
MCHC RBC AUTO-ENTMCNC: 32.5 G/DL (ref 32–36)
MCV RBC AUTO: 93 FL (ref 80–100)
MONOCYTES # BLD AUTO: 1.17 X10*3/UL (ref 0.05–0.8)
MONOCYTES NFR BLD AUTO: 8.8 %
NEUTROPHILS # BLD AUTO: 10.43 X10*3/UL (ref 1.6–5.5)
NEUTROPHILS NFR BLD AUTO: 78.8 %
NRBC BLD-RTO: 0 /100 WBCS (ref 0–0)
OXYHGB MFR BLDV: 25.5 % (ref 45–75)
PCO2 BLDV: 44 MM HG (ref 41–51)
PH BLDV: 7.46 PH (ref 7.33–7.43)
PLATELET # BLD AUTO: 322 X10*3/UL (ref 150–450)
PO2 BLDV: 22 MM HG (ref 35–45)
POTASSIUM BLDV-SCNC: 3.6 MMOL/L (ref 3.5–5.3)
POTASSIUM SERPL-SCNC: 3.5 MMOL/L (ref 3.5–5.3)
PROT SERPL-MCNC: 7.5 G/DL (ref 6.4–8.2)
PROTHROMBIN TIME: 20.6 SECONDS (ref 9.8–12.8)
RBC # BLD AUTO: 4.75 X10*6/UL (ref 4.5–5.9)
SAO2 % BLDV: 26 % (ref 45–75)
SODIUM BLDV-SCNC: 133 MMOL/L (ref 136–145)
SODIUM SERPL-SCNC: 141 MMOL/L (ref 136–145)
WBC # BLD AUTO: 13.2 X10*3/UL (ref 4.4–11.3)

## 2024-01-01 PROCEDURE — 85610 PROTHROMBIN TIME: CPT | Performed by: STUDENT IN AN ORGANIZED HEALTH CARE EDUCATION/TRAINING PROGRAM

## 2024-01-01 PROCEDURE — 99291 CRITICAL CARE FIRST HOUR: CPT | Mod: 25 | Performed by: STUDENT IN AN ORGANIZED HEALTH CARE EDUCATION/TRAINING PROGRAM

## 2024-01-01 PROCEDURE — 84132 ASSAY OF SERUM POTASSIUM: CPT | Performed by: NURSE PRACTITIONER

## 2024-01-01 PROCEDURE — 96367 TX/PROPH/DG ADDL SEQ IV INF: CPT

## 2024-01-01 PROCEDURE — 85025 COMPLETE CBC W/AUTO DIFF WBC: CPT | Performed by: NURSE PRACTITIONER

## 2024-01-01 PROCEDURE — 99223 1ST HOSP IP/OBS HIGH 75: CPT | Performed by: NURSE PRACTITIONER

## 2024-01-01 PROCEDURE — 87040 BLOOD CULTURE FOR BACTERIA: CPT | Mod: GEALAB | Performed by: NURSE PRACTITIONER

## 2024-01-01 PROCEDURE — 71045 X-RAY EXAM CHEST 1 VIEW: CPT

## 2024-01-01 PROCEDURE — 83605 ASSAY OF LACTIC ACID: CPT | Performed by: NURSE PRACTITIONER

## 2024-01-01 PROCEDURE — 94760 N-INVAS EAR/PLS OXIMETRY 1: CPT

## 2024-01-01 PROCEDURE — 80053 COMPREHEN METABOLIC PANEL: CPT | Performed by: NURSE PRACTITIONER

## 2024-01-01 PROCEDURE — 83735 ASSAY OF MAGNESIUM: CPT | Performed by: NURSE PRACTITIONER

## 2024-01-01 PROCEDURE — 1200000002 HC GENERAL ROOM WITH TELEMETRY DAILY

## 2024-01-01 PROCEDURE — 93005 ELECTROCARDIOGRAM TRACING: CPT

## 2024-01-01 PROCEDURE — 71045 X-RAY EXAM CHEST 1 VIEW: CPT | Performed by: RADIOLOGY

## 2024-01-01 PROCEDURE — 83880 ASSAY OF NATRIURETIC PEPTIDE: CPT | Performed by: NURSE PRACTITIONER

## 2024-01-01 PROCEDURE — 36415 COLL VENOUS BLD VENIPUNCTURE: CPT | Performed by: NURSE PRACTITIONER

## 2024-01-01 PROCEDURE — 2500000002 HC RX 250 W HCPCS SELF ADMINISTERED DRUGS (ALT 637 FOR MEDICARE OP, ALT 636 FOR OP/ED): Performed by: NURSE PRACTITIONER

## 2024-01-01 PROCEDURE — 36415 COLL VENOUS BLD VENIPUNCTURE: CPT | Performed by: STUDENT IN AN ORGANIZED HEALTH CARE EDUCATION/TRAINING PROGRAM

## 2024-01-01 PROCEDURE — 2500000004 HC RX 250 GENERAL PHARMACY W/ HCPCS (ALT 636 FOR OP/ED): Performed by: NURSE PRACTITIONER

## 2024-01-01 PROCEDURE — 84484 ASSAY OF TROPONIN QUANT: CPT | Performed by: NURSE PRACTITIONER

## 2024-01-01 PROCEDURE — 96375 TX/PRO/DX INJ NEW DRUG ADDON: CPT

## 2024-01-01 PROCEDURE — 96365 THER/PROPH/DIAG IV INF INIT: CPT

## 2024-01-01 RX ORDER — TALC
3 POWDER (GRAM) TOPICAL DAILY
Status: DISCONTINUED | OUTPATIENT
Start: 2024-01-01 | End: 2024-01-06 | Stop reason: HOSPADM

## 2024-01-01 RX ORDER — ACETAMINOPHEN 325 MG/1
650 TABLET ORAL EVERY 6 HOURS
Status: DISCONTINUED | OUTPATIENT
Start: 2024-01-01 | End: 2024-01-06 | Stop reason: HOSPADM

## 2024-01-01 RX ORDER — CEFTRIAXONE 1 G/50ML
1 INJECTION, SOLUTION INTRAVENOUS ONCE
Status: DISCONTINUED | OUTPATIENT
Start: 2024-01-01 | End: 2024-01-01

## 2024-01-01 RX ORDER — ONDANSETRON 4 MG/1
4 TABLET, FILM COATED ORAL EVERY 8 HOURS PRN
Status: DISCONTINUED | OUTPATIENT
Start: 2024-01-01 | End: 2024-01-06 | Stop reason: HOSPADM

## 2024-01-01 RX ORDER — DOCUSATE SODIUM 100 MG/1
100 CAPSULE, LIQUID FILLED ORAL 2 TIMES DAILY
Status: DISCONTINUED | OUTPATIENT
Start: 2024-01-01 | End: 2024-01-06 | Stop reason: HOSPADM

## 2024-01-01 RX ORDER — MAGNESIUM SULFATE HEPTAHYDRATE 40 MG/ML
2 INJECTION, SOLUTION INTRAVENOUS ONCE
Status: COMPLETED | OUTPATIENT
Start: 2024-01-01 | End: 2024-01-01

## 2024-01-01 RX ORDER — CEFTRIAXONE 2 G/50ML
2 INJECTION, SOLUTION INTRAVENOUS EVERY 24 HOURS
Status: DISCONTINUED | OUTPATIENT
Start: 2024-01-02 | End: 2024-01-02

## 2024-01-01 RX ORDER — IPRATROPIUM BROMIDE AND ALBUTEROL SULFATE 2.5; .5 MG/3ML; MG/3ML
3 SOLUTION RESPIRATORY (INHALATION)
Status: COMPLETED | OUTPATIENT
Start: 2024-01-01 | End: 2024-01-01

## 2024-01-01 RX ORDER — ALBUTEROL SULFATE 90 UG/1
2 AEROSOL, METERED RESPIRATORY (INHALATION) EVERY 6 HOURS PRN
Status: DISCONTINUED | OUTPATIENT
Start: 2024-01-01 | End: 2024-01-06 | Stop reason: HOSPADM

## 2024-01-01 RX ORDER — ONDANSETRON HYDROCHLORIDE 2 MG/ML
4 INJECTION, SOLUTION INTRAVENOUS EVERY 8 HOURS PRN
Status: DISCONTINUED | OUTPATIENT
Start: 2024-01-01 | End: 2024-01-06 | Stop reason: HOSPADM

## 2024-01-01 RX ORDER — ENOXAPARIN SODIUM 100 MG/ML
40 INJECTION SUBCUTANEOUS EVERY 24 HOURS
Status: DISCONTINUED | OUTPATIENT
Start: 2024-01-01 | End: 2024-01-02

## 2024-01-01 RX ADMIN — METHYLPREDNISOLONE SODIUM SUCCINATE 125 MG: 125 INJECTION, POWDER, FOR SOLUTION INTRAMUSCULAR; INTRAVENOUS at 19:49

## 2024-01-01 RX ADMIN — IPRATROPIUM BROMIDE AND ALBUTEROL SULFATE 3 ML: 2.5; .5 SOLUTION RESPIRATORY (INHALATION) at 19:52

## 2024-01-01 RX ADMIN — IPRATROPIUM BROMIDE AND ALBUTEROL SULFATE 3 ML: 2.5; .5 SOLUTION RESPIRATORY (INHALATION) at 19:49

## 2024-01-01 RX ADMIN — AZITHROMYCIN MONOHYDRATE 500 MG: 500 INJECTION, POWDER, LYOPHILIZED, FOR SOLUTION INTRAVENOUS at 22:10

## 2024-01-01 RX ADMIN — IPRATROPIUM BROMIDE AND ALBUTEROL SULFATE 3 ML: 2.5; .5 SOLUTION RESPIRATORY (INHALATION) at 19:55

## 2024-01-01 RX ADMIN — CEFTRIAXONE SODIUM 1 G: 1 INJECTION, SOLUTION INTRAVENOUS at 21:30

## 2024-01-01 RX ADMIN — MAGNESIUM SULFATE HEPTAHYDRATE 2 G: 2 INJECTION, SOLUTION INTRAVENOUS at 19:48

## 2024-01-01 SDOH — SOCIAL STABILITY: SOCIAL INSECURITY: WERE YOU ABLE TO COMPLETE ALL THE BEHAVIORAL HEALTH SCREENINGS?: YES

## 2024-01-01 SDOH — SOCIAL STABILITY: SOCIAL INSECURITY: ARE THERE ANY APPARENT SIGNS OF INJURIES/BEHAVIORS THAT COULD BE RELATED TO ABUSE/NEGLECT?: NO

## 2024-01-01 SDOH — SOCIAL STABILITY: SOCIAL INSECURITY: HAS ANYONE EVER THREATENED TO HURT YOUR FAMILY OR YOUR PETS?: NO

## 2024-01-01 SDOH — SOCIAL STABILITY: SOCIAL INSECURITY: HAVE YOU HAD THOUGHTS OF HARMING ANYONE ELSE?: NO

## 2024-01-01 SDOH — SOCIAL STABILITY: SOCIAL INSECURITY: ARE YOU OR HAVE YOU BEEN THREATENED OR ABUSED PHYSICALLY, EMOTIONALLY, OR SEXUALLY BY ANYONE?: NO

## 2024-01-01 SDOH — SOCIAL STABILITY: SOCIAL INSECURITY: DO YOU FEEL ANYONE HAS EXPLOITED OR TAKEN ADVANTAGE OF YOU FINANCIALLY OR OF YOUR PERSONAL PROPERTY?: NO

## 2024-01-01 SDOH — SOCIAL STABILITY: SOCIAL INSECURITY: DOES ANYONE TRY TO KEEP YOU FROM HAVING/CONTACTING OTHER FRIENDS OR DOING THINGS OUTSIDE YOUR HOME?: NO

## 2024-01-01 SDOH — SOCIAL STABILITY: SOCIAL INSECURITY: DO YOU FEEL UNSAFE GOING BACK TO THE PLACE WHERE YOU ARE LIVING?: NO

## 2024-01-01 SDOH — SOCIAL STABILITY: SOCIAL INSECURITY: ABUSE: ADULT

## 2024-01-01 ASSESSMENT — LIFESTYLE VARIABLES
EVER HAD A DRINK FIRST THING IN THE MORNING TO STEADY YOUR NERVES TO GET RID OF A HANGOVER: NO
HAVE PEOPLE ANNOYED YOU BY CRITICIZING YOUR DRINKING: NO
HOW OFTEN DO YOU HAVE 6 OR MORE DRINKS ON ONE OCCASION: NEVER
AUDIT-C TOTAL SCORE: 0
HAVE YOU EVER FELT YOU SHOULD CUT DOWN ON YOUR DRINKING: NO
HOW OFTEN DO YOU HAVE A DRINK CONTAINING ALCOHOL: NEVER
HOW MANY STANDARD DRINKS CONTAINING ALCOHOL DO YOU HAVE ON A TYPICAL DAY: PATIENT DOES NOT DRINK
PRESCIPTION_ABUSE_PAST_12_MONTHS: NO
EVER FELT BAD OR GUILTY ABOUT YOUR DRINKING: NO
SUBSTANCE_ABUSE_PAST_12_MONTHS: NO
SKIP TO QUESTIONS 9-10: 1
AUDIT-C TOTAL SCORE: 0
REASON UNABLE TO ASSESS: NO

## 2024-01-01 ASSESSMENT — PAIN SCALES - GENERAL: PAINLEVEL_OUTOF10: 0 - NO PAIN

## 2024-01-01 ASSESSMENT — PATIENT HEALTH QUESTIONNAIRE - PHQ9
1. LITTLE INTEREST OR PLEASURE IN DOING THINGS: NOT AT ALL
SUM OF ALL RESPONSES TO PHQ9 QUESTIONS 1 & 2: 0
2. FEELING DOWN, DEPRESSED OR HOPELESS: NOT AT ALL

## 2024-01-01 ASSESSMENT — ACTIVITIES OF DAILY LIVING (ADL)
ADEQUATE_TO_COMPLETE_ADL: NO
GROOMING: NEEDS ASSISTANCE
LACK_OF_TRANSPORTATION: NO
TOILETING: NEEDS ASSISTANCE
HEARING - LEFT EAR: FUNCTIONAL
BATHING: NEEDS ASSISTANCE
JUDGMENT_ADEQUATE_SAFELY_COMPLETE_DAILY_ACTIVITIES: NO
FEEDING YOURSELF: INDEPENDENT
HEARING - RIGHT EAR: FUNCTIONAL
WALKS IN HOME: NEEDS ASSISTANCE
PATIENT'S MEMORY ADEQUATE TO SAFELY COMPLETE DAILY ACTIVITIES?: NO
DRESSING YOURSELF: NEEDS ASSISTANCE

## 2024-01-01 ASSESSMENT — COGNITIVE AND FUNCTIONAL STATUS - GENERAL
HELP NEEDED FOR BATHING: A LITTLE
TOILETING: A LITTLE
STANDING UP FROM CHAIR USING ARMS: A LITTLE
PERSONAL GROOMING: A LITTLE
PATIENT BASELINE BEDBOUND: NO
MOBILITY SCORE: 18
CLIMB 3 TO 5 STEPS WITH RAILING: A LITTLE
DAILY ACTIVITIY SCORE: 18
MOVING FROM LYING ON BACK TO SITTING ON SIDE OF FLAT BED WITH BEDRAILS: A LITTLE
EATING MEALS: A LITTLE
DRESSING REGULAR LOWER BODY CLOTHING: A LITTLE
TURNING FROM BACK TO SIDE WHILE IN FLAT BAD: A LITTLE
MOVING TO AND FROM BED TO CHAIR: A LITTLE
WALKING IN HOSPITAL ROOM: A LITTLE
DRESSING REGULAR UPPER BODY CLOTHING: A LITTLE

## 2024-01-01 ASSESSMENT — PAIN - FUNCTIONAL ASSESSMENT: PAIN_FUNCTIONAL_ASSESSMENT: 0-10

## 2024-01-01 ASSESSMENT — COLUMBIA-SUICIDE SEVERITY RATING SCALE - C-SSRS
2. HAVE YOU ACTUALLY HAD ANY THOUGHTS OF KILLING YOURSELF?: NO
6. HAVE YOU EVER DONE ANYTHING, STARTED TO DO ANYTHING, OR PREPARED TO DO ANYTHING TO END YOUR LIFE?: NO
1. IN THE PAST MONTH, HAVE YOU WISHED YOU WERE DEAD OR WISHED YOU COULD GO TO SLEEP AND NOT WAKE UP?: NO

## 2024-01-01 NOTE — ED PROVIDER NOTES
HPI   Chief Complaint   Patient presents with    Shortness of Breath     Pt presents to the ER with shortness of breath that began x2 days ago. Pt has a wet cough and has dark colored phlegm.         83-year-old male presents today after he tested positive yesterday for RSV.  He endorses wheezing that can be heard audibly when you arrived in the emergency examination room.  He has a history of atrial fibs.  He has a history of aortic stenosis.  Other medical history includes congestive heart failure and diabetes.  He denies lower extremity edema.  He denies abdominal pain.  He denies becoming diaphoretic or nausea or vomiting.  He quit smoking cigarettes 50 years ago.  He denies history of COPD.  He never uses alcohol or drugs.  He is hypoxic in triage with a SpO2 of 88% when he is on room air.      History provided by:  Patient   used: No                        Jeny Coma Scale Score: 15                  Patient History   Past Medical History:   Diagnosis Date    Aortic stenosis     Atrial fibrillation (CMS/HCC)     CHF (congestive heart failure) (CMS/[facility])     Diabetes mellitus (CMS/[facility])     GERD (gastroesophageal reflux disease)      Past Surgical History:   Procedure Laterality Date    OTHER SURGICAL HISTORY  10/24/2019    No history of surgery     No family history on file.  Social History     Tobacco Use    Smoking status: Former     Types: Cigarettes    Smokeless tobacco: Never   Vaping Use    Vaping Use: Not on file   Substance Use Topics    Alcohol use: Not Currently    Drug use: Not Currently       Physical Exam   ED Triage Vitals   Temp Pulse Resp BP   -- -- -- --      SpO2 Temp src Heart Rate Source Patient Position   -- -- -- --      BP Location FiO2 (%)     -- --       Physical Exam  Constitutional:       Appearance: He is obese.   HENT:      Head: Normocephalic and atraumatic.      Mouth/Throat:      Mouth: Mucous membranes are moist.      Pharynx: Oropharynx is clear.   Eyes:       Pupils: Pupils are equal, round, and reactive to light.   Cardiovascular:      Rate and Rhythm: Normal rate and regular rhythm.   Pulmonary:      Effort: Pulmonary effort is normal.      Breath sounds: Examination of the right-upper field reveals wheezing. Examination of the left-upper field reveals wheezing. Examination of the right-middle field reveals wheezing. Examination of the left-middle field reveals wheezing. Examination of the right-lower field reveals wheezing. Examination of the left-lower field reveals wheezing. Wheezing present.   Abdominal:      General: Bowel sounds are normal.      Palpations: Abdomen is soft.   Musculoskeletal:         General: Normal range of motion.      Cervical back: Normal range of motion.   Skin:     General: Skin is warm and dry.      Capillary Refill: Capillary refill takes less than 2 seconds.   Neurological:      General: No focal deficit present.      Mental Status: He is alert.   Psychiatric:         Mood and Affect: Mood normal.         Behavior: Behavior normal.         ED Course & MDM   ED Course as of 01/02/24 0534   Mon Jan 01, 2024 1901 This is an 83-year-old male coming with short of breath.  He was tested positive yesterday for COVID.  Wheezing on exam and hypoxic.  Will give him breathing treatments mag Solu-Medrol and then reevaluate.  He is on anticoagulation and has been compliant. [WL]   2219 Patient reevaluated prior to admission and is in breathing in no acute distress at this time.  All questions answered. [WL]      ED Course User Index  [WL] Oleg Cheema DO         Diagnoses as of 01/02/24 0534   Hypoxia   Atrial fibrillation, unspecified type (CMS/HCC)   Pneumonia due to 2019-nCoV   COVID       Medical Decision Making  Patient was wheezing all fields and I ordered Solu-Medrol, magnesium, and DuoNeb x 3.  I staffed with attending.  Chest x-ray was ordered.  EKG basic labs BMP CBC CMP and troponin.  EKG is atrial fibs 99 bpm.  Patient responded  well to DuoNeb and Solu-Medrol.  Pulse ox was 88% when patient arrived.  When he was placed on 2 L nasal cannula and after DuoNeb, magnesium, and Solu-Medrol his pulse ox was now 95%.  He is not on oxygen at home.  His INR was 1.8 and patient is on Coumadin so he is subtherapeutic.  His pH is 7.46.  His BNP was only 113.  His metabolic panel was essentially normal.  His CBC had slight leukocytosis with a white count of 13.2.  X-ray of chest showed opacity in the right mid and lower lung concerning for infiltrate.  Patient was started on azithromycin and ceftriaxone.  Blood cultures were ordered.  Full report to hospitalist service who accepted patient with diagnosis of pneumonia, RSV, COVID, and hypoxia.  I wrote to admissions and they recommended full admit.  Patient was seen and staffed with attending.  His troponin was only 13.    Amount and/or Complexity of Data Reviewed  ECG/medicine tests: ordered and independent interpretation performed.     Details: EKG is atrial fibs at 99 bpm.  Patient has a known history of atrial fibs.  His QT corrected is 438 ms.  No ST elevation or depression.        Procedure  Critical Care    Performed by: Oleg Cheeam DO  Authorized by: Oleg Cheema DO    Critical care provider statement:     Critical care time (minutes):  31    Critical care time was exclusive of:  Separately billable procedures and treating other patients    Critical care was necessary to treat or prevent imminent or life-threatening deterioration of the following conditions:  Respiratory failure    Critical care was time spent personally by me on the following activities:  Ordering and performing treatments and interventions, ordering and review of laboratory studies, pulse oximetry, re-evaluation of patient's condition, review of old charts and evaluation of patient's response to treatment    Care discussed with: admitting provider         MARRY Garza  01/01/24 2253       Oleg Cheema  DO  01/02/24 0534

## 2024-01-02 PROBLEM — J15.9 PNEUMONIA DUE TO GRAM-POSITIVE BACTERIA: Status: ACTIVE | Noted: 2024-01-02

## 2024-01-02 LAB
ANION GAP BLDV CALCULATED.4IONS-SCNC: 3 MMOL/L (ref 10–25)
ANION GAP SERPL CALC-SCNC: 12 MMOL/L (ref 10–20)
BASE EXCESS BLDV CALC-SCNC: 5.6 MMOL/L (ref -2–3)
BODY TEMPERATURE: ABNORMAL
BUN SERPL-MCNC: 18 MG/DL (ref 6–23)
CA-I BLDV-SCNC: 1.16 MMOL/L (ref 1.1–1.33)
CALCIUM SERPL-MCNC: 8.6 MG/DL (ref 8.6–10.3)
CHLORIDE BLDV-SCNC: 102 MMOL/L (ref 98–107)
CHLORIDE SERPL-SCNC: 103 MMOL/L (ref 98–107)
CO2 SERPL-SCNC: 26 MMOL/L (ref 21–32)
CREAT SERPL-MCNC: 0.98 MG/DL (ref 0.5–1.3)
ERYTHROCYTE [DISTWIDTH] IN BLOOD BY AUTOMATED COUNT: 14.1 % (ref 11.5–14.5)
FLUAV RNA RESP QL NAA+PROBE: NOT DETECTED
FLUBV RNA RESP QL NAA+PROBE: NOT DETECTED
GFR SERPL CREATININE-BSD FRML MDRD: 77 ML/MIN/1.73M*2
GLUCOSE BLD MANUAL STRIP-MCNC: 123 MG/DL (ref 74–99)
GLUCOSE BLD MANUAL STRIP-MCNC: 131 MG/DL (ref 74–99)
GLUCOSE BLD MANUAL STRIP-MCNC: 144 MG/DL (ref 74–99)
GLUCOSE BLD MANUAL STRIP-MCNC: 190 MG/DL (ref 74–99)
GLUCOSE BLDV-MCNC: 169 MG/DL (ref 74–99)
GLUCOSE SERPL-MCNC: 154 MG/DL (ref 74–99)
HCO3 BLDV-SCNC: 28.9 MMOL/L (ref 22–26)
HCT VFR BLD AUTO: 42.2 % (ref 41–52)
HCT VFR BLD EST: 57 % (ref 41–52)
HGB BLD-MCNC: 13.8 G/DL (ref 13.5–17.5)
HGB BLDV-MCNC: 19 G/DL (ref 13.5–17.5)
INHALED O2 CONCENTRATION: 21 %
INR PPP: 1.8 (ref 0.9–1.1)
LACTATE BLDV-SCNC: 2.1 MMOL/L (ref 0.4–2)
LACTATE BLDV-SCNC: 2.9 MMOL/L (ref 0.4–2)
MCH RBC QN AUTO: 30.2 PG (ref 26–34)
MCHC RBC AUTO-ENTMCNC: 32.7 G/DL (ref 32–36)
MCV RBC AUTO: 92 FL (ref 80–100)
NRBC BLD-RTO: 0 /100 WBCS (ref 0–0)
OXYHGB MFR BLDV: 77.4 % (ref 45–75)
PCO2 BLDV: 37 MM HG (ref 41–51)
PH BLDV: 7.5 PH (ref 7.33–7.43)
PLATELET # BLD AUTO: 276 X10*3/UL (ref 150–450)
PO2 BLDV: 45 MM HG (ref 35–45)
POTASSIUM BLDV-SCNC: 4.1 MMOL/L (ref 3.5–5.3)
POTASSIUM SERPL-SCNC: 3.9 MMOL/L (ref 3.5–5.3)
PROTHROMBIN TIME: 20.5 SECONDS (ref 9.8–12.8)
RBC # BLD AUTO: 4.57 X10*6/UL (ref 4.5–5.9)
SAO2 % BLDV: 79 % (ref 45–75)
SARS-COV-2 RNA RESP QL NAA+PROBE: NOT DETECTED
SODIUM BLDV-SCNC: 130 MMOL/L (ref 136–145)
SODIUM SERPL-SCNC: 137 MMOL/L (ref 136–145)
WBC # BLD AUTO: 11.6 X10*3/UL (ref 4.4–11.3)

## 2024-01-02 PROCEDURE — 85027 COMPLETE CBC AUTOMATED: CPT | Performed by: NURSE PRACTITIONER

## 2024-01-02 PROCEDURE — 94760 N-INVAS EAR/PLS OXIMETRY 1: CPT

## 2024-01-02 PROCEDURE — 87636 SARSCOV2 & INF A&B AMP PRB: CPT | Performed by: NURSE PRACTITIONER

## 2024-01-02 PROCEDURE — 36415 COLL VENOUS BLD VENIPUNCTURE: CPT | Performed by: NURSE PRACTITIONER

## 2024-01-02 PROCEDURE — 1100000001 HC PRIVATE ROOM DAILY

## 2024-01-02 PROCEDURE — 2500000004 HC RX 250 GENERAL PHARMACY W/ HCPCS (ALT 636 FOR OP/ED): Performed by: NURSE PRACTITIONER

## 2024-01-02 PROCEDURE — 85610 PROTHROMBIN TIME: CPT | Performed by: NURSE PRACTITIONER

## 2024-01-02 PROCEDURE — 83605 ASSAY OF LACTIC ACID: CPT | Performed by: NURSE PRACTITIONER

## 2024-01-02 PROCEDURE — 2500000001 HC RX 250 WO HCPCS SELF ADMINISTERED DRUGS (ALT 637 FOR MEDICARE OP): Performed by: NURSE PRACTITIONER

## 2024-01-02 PROCEDURE — 82947 ASSAY GLUCOSE BLOOD QUANT: CPT

## 2024-01-02 PROCEDURE — 2500000002 HC RX 250 W HCPCS SELF ADMINISTERED DRUGS (ALT 637 FOR MEDICARE OP, ALT 636 FOR OP/ED): Performed by: NURSE PRACTITIONER

## 2024-01-02 PROCEDURE — 84132 ASSAY OF SERUM POTASSIUM: CPT | Performed by: NURSE PRACTITIONER

## 2024-01-02 PROCEDURE — 2500000001 HC RX 250 WO HCPCS SELF ADMINISTERED DRUGS (ALT 637 FOR MEDICARE OP): Performed by: FAMILY MEDICINE

## 2024-01-02 PROCEDURE — 99232 SBSQ HOSP IP/OBS MODERATE 35: CPT | Performed by: FAMILY MEDICINE

## 2024-01-02 RX ORDER — METOPROLOL TARTRATE 25 MG/1
25 TABLET, FILM COATED ORAL DAILY
Status: DISCONTINUED | OUTPATIENT
Start: 2024-01-02 | End: 2024-01-06 | Stop reason: HOSPADM

## 2024-01-02 RX ORDER — WARFARIN 3 MG/1
6 TABLET ORAL ONCE
Status: COMPLETED | OUTPATIENT
Start: 2024-01-02 | End: 2024-01-02

## 2024-01-02 RX ORDER — LANOLIN ALCOHOL/MO/W.PET/CERES
400 CREAM (GRAM) TOPICAL DAILY
Status: DISCONTINUED | OUTPATIENT
Start: 2024-01-02 | End: 2024-01-06 | Stop reason: HOSPADM

## 2024-01-02 RX ORDER — INSULIN LISPRO 100 [IU]/ML
0-5 INJECTION, SOLUTION INTRAVENOUS; SUBCUTANEOUS 4 TIMES DAILY
Status: DISCONTINUED | OUTPATIENT
Start: 2024-01-02 | End: 2024-01-06 | Stop reason: HOSPADM

## 2024-01-02 RX ORDER — CEFTRIAXONE 2 G/50ML
2 INJECTION, SOLUTION INTRAVENOUS EVERY 24 HOURS
Status: DISCONTINUED | OUTPATIENT
Start: 2024-01-03 | End: 2024-01-06 | Stop reason: HOSPADM

## 2024-01-02 RX ORDER — MENTHOL AND ZINC OXIDE .44; 20.625 G/100G; G/100G
1 OINTMENT TOPICAL
Status: DISCONTINUED | OUTPATIENT
Start: 2024-01-02 | End: 2024-01-06 | Stop reason: HOSPADM

## 2024-01-02 RX ORDER — DEXTROSE 50 % IN WATER (D50W) INTRAVENOUS SYRINGE
25
Status: DISCONTINUED | OUTPATIENT
Start: 2024-01-02 | End: 2024-01-06 | Stop reason: HOSPADM

## 2024-01-02 RX ORDER — PANTOPRAZOLE SODIUM 40 MG/1
40 TABLET, DELAYED RELEASE ORAL
Status: DISCONTINUED | OUTPATIENT
Start: 2024-01-02 | End: 2024-01-06 | Stop reason: HOSPADM

## 2024-01-02 RX ORDER — DEXTROSE MONOHYDRATE 100 MG/ML
0.3 INJECTION, SOLUTION INTRAVENOUS ONCE AS NEEDED
Status: DISCONTINUED | OUTPATIENT
Start: 2024-01-02 | End: 2024-01-06 | Stop reason: HOSPADM

## 2024-01-02 RX ORDER — GUAIFENESIN 600 MG/1
1200 TABLET, EXTENDED RELEASE ORAL DAILY
Status: DISCONTINUED | OUTPATIENT
Start: 2024-01-02 | End: 2024-01-06 | Stop reason: HOSPADM

## 2024-01-02 RX ORDER — WARFARIN 3 MG/1
6 TABLET ORAL DAILY
Status: DISCONTINUED | OUTPATIENT
Start: 2024-01-02 | End: 2024-01-06 | Stop reason: HOSPADM

## 2024-01-02 RX ORDER — FUROSEMIDE 40 MG/1
40 TABLET ORAL DAILY
Status: DISCONTINUED | OUTPATIENT
Start: 2024-01-02 | End: 2024-01-06 | Stop reason: HOSPADM

## 2024-01-02 RX ADMIN — WARFARIN SODIUM 6 MG: 3 TABLET ORAL at 01:48

## 2024-01-02 RX ADMIN — CEFTRIAXONE SODIUM 2 G: 2 INJECTION, SOLUTION INTRAVENOUS at 21:54

## 2024-01-02 RX ADMIN — DOCUSATE SODIUM 100 MG: 100 CAPSULE, LIQUID FILLED ORAL at 01:49

## 2024-01-02 RX ADMIN — ACETAMINOPHEN 650 MG: 325 TABLET ORAL at 15:11

## 2024-01-02 RX ADMIN — INSULIN LISPRO 1 UNITS: 100 INJECTION, SOLUTION INTRAVENOUS; SUBCUTANEOUS at 21:54

## 2024-01-02 RX ADMIN — FUROSEMIDE 40 MG: 40 TABLET ORAL at 09:51

## 2024-01-02 RX ADMIN — METOPROLOL TARTRATE 25 MG: 25 TABLET, FILM COATED ORAL at 09:50

## 2024-01-02 RX ADMIN — GUAIFENESIN 1200 MG: 600 TABLET ORAL at 05:15

## 2024-01-02 RX ADMIN — Medication 400 MG: at 09:51

## 2024-01-02 RX ADMIN — PANTOPRAZOLE SODIUM 40 MG: 40 TABLET, DELAYED RELEASE ORAL at 06:28

## 2024-01-02 RX ADMIN — ACETAMINOPHEN 650 MG: 325 TABLET ORAL at 09:51

## 2024-01-02 RX ADMIN — WARFARIN SODIUM 6 MG: 3 TABLET ORAL at 17:05

## 2024-01-02 RX ADMIN — DOCUSATE SODIUM 100 MG: 100 CAPSULE, LIQUID FILLED ORAL at 09:50

## 2024-01-02 RX ADMIN — Medication 1 APPLICATION: at 21:54

## 2024-01-02 ASSESSMENT — COGNITIVE AND FUNCTIONAL STATUS - GENERAL
DAILY ACTIVITIY SCORE: 19
DRESSING REGULAR LOWER BODY CLOTHING: A LITTLE
WALKING IN HOSPITAL ROOM: A LITTLE
MOBILITY SCORE: 21
TOILETING: A LITTLE
PERSONAL GROOMING: A LITTLE
STANDING UP FROM CHAIR USING ARMS: A LITTLE
CLIMB 3 TO 5 STEPS WITH RAILING: A LITTLE
HELP NEEDED FOR BATHING: A LITTLE
DRESSING REGULAR UPPER BODY CLOTHING: A LITTLE

## 2024-01-02 ASSESSMENT — ENCOUNTER SYMPTOMS
AGITATION: 0
POLYDIPSIA: 0
PALPITATIONS: 0
SORE THROAT: 0
COUGH: 1
FEVER: 0
BRUISES/BLEEDS EASILY: 0
WOUND: 1
HEADACHES: 0
ABDOMINAL PAIN: 0
CHILLS: 0
DYSURIA: 0
SHORTNESS OF BREATH: 1
DIARRHEA: 0
DIZZINESS: 0
EYE ITCHING: 0
NECK PAIN: 0
EYE REDNESS: 0
NECK STIFFNESS: 0

## 2024-01-02 ASSESSMENT — PAIN - FUNCTIONAL ASSESSMENT: PAIN_FUNCTIONAL_ASSESSMENT: 0-10

## 2024-01-02 ASSESSMENT — PAIN SCALES - GENERAL
PAINLEVEL_OUTOF10: 0 - NO PAIN
PAINLEVEL_OUTOF10: 0 - NO PAIN

## 2024-01-02 ASSESSMENT — ACTIVITIES OF DAILY LIVING (ADL): LACK_OF_TRANSPORTATION: NO

## 2024-01-02 NOTE — H&P
History Of Present Illness  Zackary Shah is a 83 y.o. male with a pertinent medical history of aortic stenosis, atrial fibrillation and diastolic heart failure (EF 68% December 2023; diastolic function not evaluated on this echo, previous echo showed impairment of diastolic function with a EF of 55%) who presenting to [facility] ER with ongoing complaints of generalized weakness, worsening cough that was wet sounding with yellow mucus and increased dyspnea with diaphoresis. He was admitted on 12/26 for RSV and discharged one day later. Patient was originally requiring oxygen in the emergency room and is now 93% on room air.  He lives home alone and was having difficulty caring for himself. He denies fever, chills, chest pain, sore throat, leg swelling. Pertinent workup in the ER included: INR 1.8, leukocytosis of 13,000 with left shift and blood cultures in process.  Flu and COVID are negative. RSV was positive on December 26.  Blood gas did not for hypoxia.  Right lung infiltrate noted on chest x-ray.     Past Medical History  He has a past medical history of Aortic stenosis, Atrial fibrillation (CMS/[facility]), CHF (congestive heart failure) (CMS/[facility]), Diabetes mellitus (CMS/[facility]), and GERD (gastroesophageal reflux disease).    Surgical History  nicholas Mora.     Social History  He reports that he has quit smoking. His smoking use included cigarettes. He has never used smokeless tobacco. He reports that he does not currently use alcohol. He reports that he does not currently use drugs.    Family History  Son-pacemaker.     Allergies  Codeine    Review of Systems   Constitutional:  Negative for chills and fever.   HENT:  Positive for hearing loss. Negative for sore throat.    Eyes:  Negative for redness and itching.   Respiratory:  Positive for cough and shortness of breath.    Cardiovascular:  Negative for chest pain and palpitations.   Gastrointestinal:  Negative for abdominal pain and diarrhea.   Endocrine: Negative for  polydipsia and polyuria.   Genitourinary:  Negative for dysuria and urgency.   Musculoskeletal:  Negative for neck pain and neck stiffness.        +weakness (generalizeD)   Skin:  Positive for rash and wound.   Neurological:  Negative for dizziness and headaches.   Hematological:  Does not bruise/bleed easily.   Psychiatric/Behavioral:  Negative for agitation and behavioral problems.        Physical Exam  Constitutional:       General: He is not in acute distress.     Appearance: He is obese. He is ill-appearing, nontoxic-appearing & + +diaphoretic.   HENT:      Head: Normocephalic and atraumatic.      Mouth/Throat:      Mouth: Mucous membranes are moist.      Pharynx: Oropharynx is clear.   Eyes:      Extraocular Movements: Extraocular movements intact.      Conjunctiva/sclera: Conjunctivae normal.   Cardiovascular:      Rate and Rhythm: Normal rate. Rhythm irregular.      Pulses: Normal pulses.      Heart sounds: Normal heart sounds. No murmur heard.     Comments: NO JVD  Pulmonary:      Effort: Pulmonary effort is normal. No respiratory distress. Breath sounds: Mild Wheezing present. No rhonchi. Wet sounding cough.     Comments: No use of accessory muscles  Abdominal:      General: Bowel sounds are normal. There is no distension.      Palpations: Abdomen is soft.      Tenderness: There is no abdominal tenderness. There is no guarding.      Hernia: A hernia is present.      Comments: Reducible hernia+   Musculoskeletal:         General: No swelling. Decreased range of motion. Can barely lift legs off the bed. Strength BLE 4/5.     Right lower leg: No edema.      Left lower leg: No edema.   Skin:     General: Skin is dry.      Coloration: Skin is pale.      Findings: No erythema or rash.   Neurological:      Mental Status: He is alert and oriented to person, place, and time. Mental status is at baseline.      Motor: No weakness.   Psychiatric:         Mood and Affect: Mood normal.         Behavior: Behavior normal.      Last Recorded Vitals  /78 (BP Location: Left arm, Patient Position: Sitting)   Pulse 103   Temp 36.4 °C (97.6 °F) (Temporal)   Resp 22   Wt 105 kg (231 lb)   SpO2 93%     Relevant Results  Scheduled medications  acetaminophen, 650 mg, oral, q6h  azithromycin, 500 mg, intravenous, q24h  cefTRIAXone, 2 g, intravenous, q24h  docusate sodium, 100 mg, oral, BID  enoxaparin, 40 mg, subcutaneous, q24h  furosemide, 40 mg, oral, Daily  insulin lispro, 0-5 Units, subcutaneous, 4x daily  magnesium oxide, 400 mg, oral, Daily  melatonin, 3 mg, oral, Daily  metoprolol tartrate, 25 mg, oral, Daily  pantoprazole, 40 mg, oral, Daily before breakfast  psyllium, 1 packet, oral, Daily  warfarin, 6 mg, oral, Daily  warfarin, 6 mg, oral, Once      Continuous medications     PRN medications  PRN medications: albuterol, dextrose 10 % in water (D10W), dextrose, glucagon, ondansetron **OR** ondansetron    Results for orders placed or performed during the hospital encounter of 01/01/24 (from the past 24 hour(s))   CBC and Auto Differential   Result Value Ref Range    WBC 13.2 (H) 4.4 - 11.3 x10*3/uL    nRBC 0.0 0.0 - 0.0 /100 WBCs    RBC 4.75 4.50 - 5.90 x10*6/uL    Hemoglobin 14.4 13.5 - 17.5 g/dL    Hematocrit 44.3 41.0 - 52.0 %    MCV 93 80 - 100 fL    MCH 30.3 26.0 - 34.0 pg    MCHC 32.5 32.0 - 36.0 g/dL    RDW 14.2 11.5 - 14.5 %    Platelets 322 150 - 450 x10*3/uL    Neutrophils % 78.8 40.0 - 80.0 %    Immature Granulocytes %, Automated 0.6 0.0 - 0.9 %    Lymphocytes % 11.3 13.0 - 44.0 %    Monocytes % 8.8 2.0 - 10.0 %    Eosinophils % 0.3 0.0 - 6.0 %    Basophils % 0.2 0.0 - 2.0 %    Neutrophils Absolute 10.43 (H) 1.60 - 5.50 x10*3/uL    Immature Granulocytes Absolute, Automated 0.08 0.00 - 0.50 x10*3/uL    Lymphocytes Absolute 1.49 0.80 - 3.00 x10*3/uL    Monocytes Absolute 1.17 (H) 0.05 - 0.80 x10*3/uL    Eosinophils Absolute 0.04 0.00 - 0.40 x10*3/uL    Basophils Absolute 0.03 0.00 - 0.10 x10*3/uL   Comprehensive  metabolic panel   Result Value Ref Range    Glucose 122 (H) 74 - 99 mg/dL    Sodium 141 136 - 145 mmol/L    Potassium 3.5 3.5 - 5.3 mmol/L    Chloride 99 98 - 107 mmol/L    Bicarbonate 28 21 - 32 mmol/L    Anion Gap 18 10 - 20 mmol/L    Urea Nitrogen 20 6 - 23 mg/dL    Creatinine 1.16 0.50 - 1.30 mg/dL    eGFR 62 >60 mL/min/1.73m*2    Calcium 8.7 8.6 - 10.3 mg/dL    Albumin 3.7 3.4 - 5.0 g/dL    Alkaline Phosphatase 91 33 - 136 U/L    Total Protein 7.5 6.4 - 8.2 g/dL    AST 22 9 - 39 U/L    Bilirubin, Total 1.7 (H) 0.0 - 1.2 mg/dL    ALT 28 10 - 52 U/L   Magnesium   Result Value Ref Range    Magnesium 2.18 1.60 - 2.40 mg/dL   Troponin I, High Sensitivity   Result Value Ref Range    Troponin I, High Sensitivity 13 0 - 20 ng/L   B-Type Natriuretic Peptide   Result Value Ref Range     (H) 0 - 99 pg/mL   Blood Gas Venous Full Panel   Result Value Ref Range    POCT pH, Venous 7.46 (H) 7.33 - 7.43 pH    POCT pCO2, Venous 44 41 - 51 mm Hg    POCT pO2, Venous 22 (L) 35 - 45 mm Hg    POCT SO2, Venous 26 (L) 45 - 75 %    POCT Oxy Hemoglobin, Venous 25.5 (L) 45.0 - 75.0 %    POCT Hematocrit Calculated, Venous 65.0 (H) 41.0 - 52.0 %    POCT Sodium, Venous 133 (L) 136 - 145 mmol/L    POCT Potassium, Venous 3.6 3.5 - 5.3 mmol/L    POCT Chloride, Venous 99 98 - 107 mmol/L    POCT Ionized Calicum, Venous 1.12 1.10 - 1.33 mmol/L    POCT Glucose, Venous 122 (H) 74 - 99 mg/dL    POCT Lactate, Venous 2.6 (H) 0.4 - 2.0 mmol/L    POCT Base Excess, Venous 6.1 (H) -2.0 - 3.0 mmol/L    POCT HCO3 Calculated, Venous 31.3 (H) 22.0 - 26.0 mmol/L    POCT Hemoglobin, Venous 21.6 (H) 13.5 - 17.5 g/dL    POCT Anion Gap, Venous 6.0 (L) 10.0 - 25.0 mmol/L    Patient Temperature      FiO2 62 %   Protime-INR   Result Value Ref Range    Protime 20.6 (H) 9.8 - 12.8 seconds    INR 1.8 (H) 0.9 - 1.1   Lactate   Result Value Ref Range    Lactate 1.6 0.4 - 2.0 mmol/L   Sars-CoV-2 and Influenza A/B PCR   Result Value Ref Range    Flu A Result Not  Detected Not Detected    Flu B Result Not Detected Not Detected    Coronavirus 2019, PCR Not Detected Not Detected     XR chest 1 view    Result Date: 1/1/2024  Interpreted By:  Miguel Ángel Bullock, STUDY: XR CHEST 1 VIEW;  1/1/2024 7:53 pm   INDICATION: Signs/Symptoms:sob.   COMPARISON: None.   ACCESSION NUMBER(S): [id number]   ORDERING CLINICIAN: NARAYAN MARTIN   FINDINGS: There is cardiomegaly. Opacities in the right mid and lower lung concerning for infiltrate. Mild left basilar atelectasis. Trace left pleural effusion not excluded. No pneumothorax.       Opacities in the right mid and lower lung concerning for infiltrate; short-term progress imaging recommended to assure resolution.   MACRO: None   Signed by: Miguel Ángel Bullock 1/1/2024 8:04 PM Dictation workstation:   [id number]     Assessment/Plan   Principal Problem:  Hypoxemic Respiratory Failure d/t Gram Positive PNA/RSV/SIRS  Oyxgen/pulse ox  Azithromycin and Rocephin/Mucinex/Albuterol  ID consulted    Subtherapeutic INR  Restart daily 6 mg coumadin  Monitor INR    Generalized Weakness  -Suspect cannot care for self at home  PT/SW     Heart failure (CMS/HCC)/Moderate Severe Aortic Stenosis  -Chronic  Daily wt/Strict I's/O's/Low salt diet  Continue lasix metoprolol     NIDDM   -last A1C 6.0  BSG controlled  Hold metformin while inpatient  Start sliding scale and accucheck while inpatient    Rash Vs Wound to Buttocks  Wound care consult    Constipation  Bowel regimen    GERD  PPI     DVT PX  SCDs  Coumadin       Kimberley Guerrier, APRN-CNP    INR 1.8, leukocytosis of 13,000 with left shift and blood cultures in process.  Flu and COVID are negative. RSV was positive on December 26.  Blood gas did not for hypoxia.  Right lung infiltrate noted on chest x-ray.

## 2024-01-02 NOTE — PROGRESS NOTES
01/02/24 0848   Discharge Planning   Living Arrangements Alone   Support Systems Children  (son and daughter live near)   Assistance Needed A&OX3; independent with ADLs no assistive devices;drives room air baseline and currenlty on room air;   Type of Residence Private residence   Number of Stairs to Enter Residence 3   Number of Stairs Within Residence 0   Do you have animals or pets at home? No   Who is requesting discharge planning? Provider   Home or Post Acute Services In home services   Type of Home Care Services Home nursing visits   Patient expects to be discharged to: home with [facility]   Does the patient need discharge transport arranged? Yes   RoundTrip coordination needed? Yes   Has discharge transport been arranged? No   Financial Resource Strain   How hard is it for you to pay for the very basics like food, housing, medical care, and heating? Not hard   Housing Stability   In the last 12 months, was there a time when you were not able to pay the mortgage or rent on time? N   In the last 12 months, how many places have you lived? 1   In the last 12 months, was there a time when you did not have a steady place to sleep or slept in a shelter (including now)? N   Transportation Needs   In the past 12 months, has lack of transportation kept you from medical appointments or from getting medications? no   In the past 12 months, has lack of transportation kept you from meetings, work, or from getting things needed for daily living? No     01/02/2023 0852am  Patient is a readmit. Denied needs last admit. Patient will be new  Home Health upon discharge. Spoke with patient's son Don and mad him aware of dc plan with [facility]. Will continue to follow.

## 2024-01-02 NOTE — NURSING NOTE
1/1/: pt arrived to [facility] [address], assumed pt care    1/2/: pt is ordered tele but we don't have tele boxes, let Kimberley Guerrier NP know.     1/2/: continuous pulse ox is ordered for pt but we don't have any, let Kimberley Guerrier know

## 2024-01-02 NOTE — PROGRESS NOTES
Zackary Shah is a 83 y.o. male on day 1 of admission presenting with Hypoxia.      Subjective   Patient reports overall improvement in dyspnea however reports continued dyspnea with exertion.       Objective     Last Recorded Vitals  /80 (BP Location: Left arm, Patient Position: Lying)   Pulse 83   Temp 36 °C (96.8 °F) (Temporal)   Resp 20   Wt 100 kg (220 lb 14.4 oz)   SpO2 94%   Intake/Output last 3 Shifts:    Intake/Output Summary (Last 24 hours) at 1/2/2024 0732  Last data filed at 1/2/2024 0159  Gross per 24 hour   Intake 550 ml   Output --   Net 550 ml       Admission Weight  Weight: 105 kg (231 lb) (01/01/24 1849)    Daily Weight  01/02/24 : 100 kg (220 lb 14.4 oz)    Image Results  XR chest 1 view  Narrative: Interpreted By:  Miguel Ángel Bullock,   STUDY:  XR CHEST 1 VIEW;  1/1/2024 7:53 pm      INDICATION:  Signs/Symptoms:sob.      COMPARISON:  None.      ACCESSION NUMBER(S):  [id number]      ORDERING CLINICIAN:  NARAYAN MARTIN      FINDINGS:  There is cardiomegaly. Opacities in the right mid and lower lung  concerning for infiltrate. Mild left basilar atelectasis. Trace left  pleural effusion not excluded. No pneumothorax.      Impression: Opacities in the right mid and lower lung concerning for infiltrate;  short-term progress imaging recommended to assure resolution.      MACRO:  None      Signed by: Miguel Ángel Bullock 1/1/2024 8:04 PM  Dictation workstation:   [id number]      Physical Exam  Constitutional:       General: He is not in acute distress.     Appearance: He is obese. He is ill-appearing, nontoxic-appearing & + +diaphoretic.   HENT:      Head: Normocephalic and atraumatic.      Mouth/Throat:      Mouth: Mucous membranes are moist.      Pharynx: Oropharynx is clear.   Eyes:      Extraocular Movements: Extraocular movements intact.      Conjunctiva/sclera: Conjunctivae normal.   Cardiovascular:      Rate and Rhythm: Normal rate. Rhythm irregular.      Pulses: Normal pulses.      Heart  sounds: Normal heart sounds. No murmur heard.     Comments: NO JVD  Pulmonary:      Effort: Pulmonary effort is normal. No respiratory distress. Breath sounds: Mild Wheezing present. No rhonchi. Wet sounding cough.     Comments: No use of accessory muscles  Abdominal:      General: Bowel sounds are normal. There is no distension.      Palpations: Abdomen is soft.      Tenderness: There is no abdominal tenderness. There is no guarding.   Musculoskeletal:         General: No swelling. Decreased range of motion. Can barely lift legs off the bed. Strength BLE 4/5.     Right lower leg: No edema.      Left lower leg: No edema.   Skin:     General: Skin is dry.      Findings: No erythema or rash.   Neurological:      Mental Status: He is alert and oriented to person, place, and time. Mental status is at baseline.      Motor: No weakness.   Psychiatric:         Mood and Affect: Mood normal.             Assessment/Plan      Zackary Shah is a 83 y.o. male with a pertinent medical history of aortic stenosis, atrial fibrillation and diastolic heart failure (EF 68% December 2023; diastolic function not evaluated on this echo, previous echo showed impairment of diastolic function with a EF of 55%) who presenting to [facility] ER with ongoing complaints of generalized weakness, worsening cough that was wet sounding with yellow mucus and increased dyspnea with diaphoresis is found to have acute hypoxic respiratory failure due to pneumonia    Acute hypoxic respiratory failure due to pneumonia  Continue supplemental oxygen  Continue azithromycin and Rocephin  Give albuterol as needed  ID consulted    Atrial fibrillation/subtherapeutic INR  Continue Coumadin  Monitor INR  Monitor on telemetry    Moderate to severe aortic stenosis/diastolic heart failure  Appears compensated  Follow daily weights, strict I's and O's continue low-sodium diet  Continue Lasix and metoprolol    Type 2 diabetes mellitus  Last hemoglobin A1c was 6  Holding  metformin  Continue insulin sliding scale with Accu-Cheks    Wound on buttocks  Wound care consulted    Constipation  Continue bowel regimen    GERD  PPI    DVT prophylaxis  SCDs and Coumadin                Jesus Freeman DO

## 2024-01-02 NOTE — CONSULTS
Consults  Referred by ASHLEY Palacios    Primary MD: Lawrence Gregory DO    Reason For Consult  pneumonia    History Of Present Illness  Zackary Shah is a 83 y.o. male, hx of AF, hx of A.S., hx of CHF, hx of DM, hx of GERD, hx of RSV 12/25, he was in the hospital for 2 days, he was readmitted for increasing weakness, sob, productive cough, his cxr showed Rt sided infiltrate, the Flu / covid screen is negative, no fever, no chest pain, no emesis or diarrhea     Past Medical History  He has a past medical history of Aortic stenosis, Atrial fibrillation (CMS/HCC), CHF (congestive heart failure) (CMS/HCC), Diabetes mellitus (CMS/HCC), and GERD (gastroesophageal reflux disease).    Surgical History  He has a past surgical history that includes Other surgical history (10/24/2019); Cholecystectomy; and Appendectomy.     Social History     Occupational History    Not on file   Tobacco Use    Smoking status: Former     Types: Cigarettes    Smokeless tobacco: Never   Vaping Use    Vaping Use: Not on file   Substance and Sexual Activity    Alcohol use: Not Currently    Drug use: Not Currently    Sexual activity: Not on file     Travel History   Travel since 12/02/23    No documented travel since 12/02/23       Family History  Family History   Problem Relation Name Age of Onset    Other (pacemaker) Son       Allergies  Codeine       There is no immunization history on file for this patient.  Pneumonia and influenza vaccines are up to date  Corona fall risk 35, preventive protocol was implemented  Depression screen is negative    Medications  Home medications:  Medications Prior to Admission   Medication Sig Dispense Refill Last Dose    furosemide (Lasix) 40 mg tablet Take 1 tablet (40 mg) by mouth once daily.   1/1/2024    magnesium oxide (Mag-Ox) 400 mg tablet 1 tablet (400 mg) once daily.   1/1/2024    metFORMIN, OSM, (Fortamet) 500 mg 24 hr tablet Take 1 tablet (500 mg) by mouth. Do not crush, chew, or split.   1/1/2024     "metoprolol tartrate (Lopressor) 25 mg tablet Take 1 tablet (25 mg) by mouth once daily.   1/1/2024    omeprazole (PriLOSEC) 40 mg DR capsule Take 20 mg by mouth once daily in the morning. Take before meals. Do not crush or chew.   1/1/2024    psyllium (Metamucil) 3.4 gram packet Take 1 packet by mouth once daily.   1/1/2024    warfarin (Coumadin) 6 mg tablet Take 1 tablet (6 mg) by mouth. Take as directed per After Visit Summary.   1/1/2024     Current medications:  Scheduled medications  acetaminophen, 650 mg, oral, q6h  azithromycin, 500 mg, intravenous, q24h  cefTRIAXone, 2 g, intravenous, q24h  docusate sodium, 100 mg, oral, BID  furosemide, 40 mg, oral, Daily  guaiFENesin, 1,200 mg, oral, Daily  insulin lispro, 0-5 Units, subcutaneous, 4x daily  magnesium oxide, 400 mg, oral, Daily  melatonin, 3 mg, oral, Daily  metoprolol tartrate, 25 mg, oral, Daily  pantoprazole, 40 mg, oral, Daily before breakfast  psyllium, 1 packet, oral, Daily  warfarin, 6 mg, oral, Daily      Continuous medications     PRN medications  PRN medications: albuterol, dextrose 10 % in water (D10W), dextrose, glucagon, ondansetron **OR** ondansetron    Review of Systems   All other systems reviewed and are negative.       Objective  Range of Vitals (last 24 hours)  Heart Rate:  []   Temp:  [36 °C (96.8 °F)-37.1 °C (98.8 °F)]   Resp:  [20-27]   BP: ()/()   Height:  [180.3 cm (5' 11\")]   Weight:  [100 kg (220 lb 14.4 oz)-105 kg (231 lb)]   SpO2:  [88 %-97 %]   Daily Weight  01/02/24 : 100 kg (220 lb 14.4 oz)    Body mass index is 30.81 kg/m².   Nutritional consult    Physical Exam  Constitutional:       Appearance: Normal appearance.   HENT:      Head: Normocephalic and atraumatic.      Mouth/Throat:      Mouth: Mucous membranes are moist.      Pharynx: Oropharynx is clear.   Eyes:      Pupils: Pupils are equal, round, and reactive to light.   Cardiovascular:      Rate and Rhythm: Normal rate and regular rhythm.      Heart " "sounds: Murmur heard.   Pulmonary:      Effort: Pulmonary effort is normal.      Breath sounds: Rales present.   Abdominal:      General: Abdomen is flat. Bowel sounds are normal.      Palpations: Abdomen is soft.   Musculoskeletal:      Cervical back: Normal range of motion.   Neurological:      Mental Status: He is alert.          Relevant Results  Outside Hospital Results  reviewed  Labs  Results from last 72 hours   Lab Units 01/02/24  0606 01/01/24  1857   WBC AUTO x10*3/uL 11.6* 13.2*   HEMOGLOBIN g/dL 13.8 14.4   HEMATOCRIT % 42.2 44.3   PLATELETS AUTO x10*3/uL 276 322   NEUTROS PCT AUTO %  --  78.8   LYMPHS PCT AUTO %  --  11.3   MONOS PCT AUTO %  --  8.8   EOS PCT AUTO %  --  0.3     Results from last 72 hours   Lab Units 01/02/24  0606 01/01/24  1857   SODIUM mmol/L 137 141   POTASSIUM mmol/L 3.9 3.5   CHLORIDE mmol/L 103 99   CO2 mmol/L 26 28   BUN mg/dL 18 20   CREATININE mg/dL 0.98 1.16   GLUCOSE mg/dL 154* 122*   CALCIUM mg/dL 8.6 8.7   ANION GAP mmol/L 12 18   EGFR mL/min/1.73m*2 77 62     Results from last 72 hours   Lab Units 01/01/24  1857   ALK PHOS U/L 91   BILIRUBIN TOTAL mg/dL 1.7*   PROTEIN TOTAL g/dL 7.5   ALT U/L 28   AST U/L 22   ALBUMIN g/dL 3.7     Estimated Creatinine Clearance: 68.8 mL/min (by C-G formula based on SCr of 0.98 mg/dL).  No results found for: \"CRP\", \"SEDRATE\"  No results found for: \"HIV1X2\", \"HIVCONF\", \"UDDGDA9NS\"  No results found for: \"HEPCABINIT\", \"HEPCAB\", \"HCVPCRQUANT\"  Microbiology  Reviewed  Imaging  Reviewed       Assessment/Plan     Likely postviral pneumonia    Recommendations :  Continue Rocephin  Sputum culture  Chest PT / incentive spirometry    I spent minutes in the professional and overall care of this patient.      Daniel Leal MD  "

## 2024-01-03 ENCOUNTER — APPOINTMENT (OUTPATIENT)
Dept: RADIOLOGY | Facility: HOSPITAL | Age: 84
DRG: 193 | End: 2024-01-03
Payer: MEDICARE

## 2024-01-03 LAB
BNP SERPL-MCNC: 107 PG/ML (ref 0–99)
GLUCOSE BLD MANUAL STRIP-MCNC: 124 MG/DL (ref 74–99)
GLUCOSE BLD MANUAL STRIP-MCNC: 149 MG/DL (ref 74–99)
GLUCOSE BLD MANUAL STRIP-MCNC: 91 MG/DL (ref 74–99)
GLUCOSE BLD MANUAL STRIP-MCNC: 92 MG/DL (ref 74–99)
INR PPP: 2.1 (ref 0.9–1.1)
PROTHROMBIN TIME: 23.7 SECONDS (ref 9.8–12.8)

## 2024-01-03 PROCEDURE — 99232 SBSQ HOSP IP/OBS MODERATE 35: CPT | Performed by: FAMILY MEDICINE

## 2024-01-03 PROCEDURE — 2500000005 HC RX 250 GENERAL PHARMACY W/O HCPCS: Performed by: FAMILY MEDICINE

## 2024-01-03 PROCEDURE — 94667 MNPJ CHEST WALL 1ST: CPT

## 2024-01-03 PROCEDURE — 71045 X-RAY EXAM CHEST 1 VIEW: CPT | Performed by: STUDENT IN AN ORGANIZED HEALTH CARE EDUCATION/TRAINING PROGRAM

## 2024-01-03 PROCEDURE — 2500000002 HC RX 250 W HCPCS SELF ADMINISTERED DRUGS (ALT 637 FOR MEDICARE OP, ALT 636 FOR OP/ED): Performed by: NURSE PRACTITIONER

## 2024-01-03 PROCEDURE — 36415 COLL VENOUS BLD VENIPUNCTURE: CPT | Performed by: FAMILY MEDICINE

## 2024-01-03 PROCEDURE — 1100000001 HC PRIVATE ROOM DAILY

## 2024-01-03 PROCEDURE — 82947 ASSAY GLUCOSE BLOOD QUANT: CPT

## 2024-01-03 PROCEDURE — 97161 PT EVAL LOW COMPLEX 20 MIN: CPT | Mod: GP

## 2024-01-03 PROCEDURE — 2500000004 HC RX 250 GENERAL PHARMACY W/ HCPCS (ALT 636 FOR OP/ED): Performed by: NURSE PRACTITIONER

## 2024-01-03 PROCEDURE — 2500000001 HC RX 250 WO HCPCS SELF ADMINISTERED DRUGS (ALT 637 FOR MEDICARE OP): Performed by: NURSE PRACTITIONER

## 2024-01-03 PROCEDURE — 85610 PROTHROMBIN TIME: CPT | Performed by: FAMILY MEDICINE

## 2024-01-03 PROCEDURE — 83880 ASSAY OF NATRIURETIC PEPTIDE: CPT | Performed by: FAMILY MEDICINE

## 2024-01-03 PROCEDURE — 94640 AIRWAY INHALATION TREATMENT: CPT

## 2024-01-03 PROCEDURE — 71045 X-RAY EXAM CHEST 1 VIEW: CPT

## 2024-01-03 PROCEDURE — 94760 N-INVAS EAR/PLS OXIMETRY 1: CPT

## 2024-01-03 PROCEDURE — 2500000002 HC RX 250 W HCPCS SELF ADMINISTERED DRUGS (ALT 637 FOR MEDICARE OP, ALT 636 FOR OP/ED): Performed by: FAMILY MEDICINE

## 2024-01-03 RX ORDER — IPRATROPIUM BROMIDE AND ALBUTEROL SULFATE 2.5; .5 MG/3ML; MG/3ML
3 SOLUTION RESPIRATORY (INHALATION)
Status: DISCONTINUED | OUTPATIENT
Start: 2024-01-03 | End: 2024-01-03

## 2024-01-03 RX ORDER — IPRATROPIUM BROMIDE AND ALBUTEROL SULFATE 2.5; .5 MG/3ML; MG/3ML
3 SOLUTION RESPIRATORY (INHALATION) EVERY 2 HOUR PRN
Status: DISCONTINUED | OUTPATIENT
Start: 2024-01-03 | End: 2024-01-06 | Stop reason: HOSPADM

## 2024-01-03 RX ORDER — IPRATROPIUM BROMIDE AND ALBUTEROL SULFATE 2.5; .5 MG/3ML; MG/3ML
3 SOLUTION RESPIRATORY (INHALATION)
Status: DISCONTINUED | OUTPATIENT
Start: 2024-01-03 | End: 2024-01-06 | Stop reason: HOSPADM

## 2024-01-03 RX ADMIN — PANTOPRAZOLE SODIUM 40 MG: 40 TABLET, DELAYED RELEASE ORAL at 05:48

## 2024-01-03 RX ADMIN — IPRATROPIUM BROMIDE AND ALBUTEROL SULFATE 3 ML: 2.5; .5 SOLUTION RESPIRATORY (INHALATION) at 19:28

## 2024-01-03 RX ADMIN — WARFARIN SODIUM 6 MG: 3 TABLET ORAL at 17:54

## 2024-01-03 RX ADMIN — Medication 400 MG: at 08:42

## 2024-01-03 RX ADMIN — ACETAMINOPHEN 650 MG: 325 TABLET ORAL at 21:29

## 2024-01-03 RX ADMIN — FUROSEMIDE 40 MG: 40 TABLET ORAL at 08:42

## 2024-01-03 RX ADMIN — IPRATROPIUM BROMIDE AND ALBUTEROL SULFATE 3 ML: 2.5; .5 SOLUTION RESPIRATORY (INHALATION) at 15:32

## 2024-01-03 RX ADMIN — GUAIFENESIN 1200 MG: 600 TABLET ORAL at 05:48

## 2024-01-03 RX ADMIN — Medication 3 MG: at 02:29

## 2024-01-03 RX ADMIN — METOPROLOL TARTRATE 25 MG: 25 TABLET, FILM COATED ORAL at 08:42

## 2024-01-03 RX ADMIN — Medication 1 APPLICATION: at 14:34

## 2024-01-03 RX ADMIN — Medication 2 L/MIN: at 15:45

## 2024-01-03 RX ADMIN — ALBUTEROL SULFATE 2 PUFF: 90 AEROSOL, METERED RESPIRATORY (INHALATION) at 00:49

## 2024-01-03 RX ADMIN — DOCUSATE SODIUM 100 MG: 100 CAPSULE, LIQUID FILLED ORAL at 08:42

## 2024-01-03 RX ADMIN — CEFTRIAXONE SODIUM 2 G: 2 INJECTION, SOLUTION INTRAVENOUS at 21:28

## 2024-01-03 RX ADMIN — DOCUSATE SODIUM 100 MG: 100 CAPSULE, LIQUID FILLED ORAL at 21:29

## 2024-01-03 RX ADMIN — ACETAMINOPHEN 650 MG: 325 TABLET ORAL at 08:49

## 2024-01-03 ASSESSMENT — ACTIVITIES OF DAILY LIVING (ADL): ADL_ASSISTANCE: INDEPENDENT

## 2024-01-03 ASSESSMENT — COGNITIVE AND FUNCTIONAL STATUS - GENERAL
STANDING UP FROM CHAIR USING ARMS: A LITTLE
MOVING TO AND FROM BED TO CHAIR: A LITTLE
CLIMB 3 TO 5 STEPS WITH RAILING: A LITTLE
MOBILITY SCORE: 20
WALKING IN HOSPITAL ROOM: A LITTLE

## 2024-01-03 ASSESSMENT — PAIN - FUNCTIONAL ASSESSMENT: PAIN_FUNCTIONAL_ASSESSMENT: 0-10

## 2024-01-03 ASSESSMENT — PAIN SCALES - GENERAL: PAINLEVEL_OUTOF10: 0 - NO PAIN

## 2024-01-03 NOTE — PROGRESS NOTES
Physical Therapy    Physical Therapy Evaluation    Patient Name: Zackary Shah  MRN: [id number]  Today's Date: 1/3/2024   Time Calculation  Start Time: 1032  Stop Time: 1051  Time Calculation (min): 19 min    Assessment/Plan   PT Assessment  PT Assessment Results: Decreased strength, Decreased endurance, Impaired balance, Decreased mobility  Rehab Prognosis: Good  Evaluation/Treatment Tolerance: Patient limited by fatigue  Medical Staff Made Aware: Yes  End of Session Communication: Bedside nurse  Assessment Comment: pt demonstrates decresaed functional endurance, balance and gait isntability. Recommend continued PT to address impairments.  End of Session Patient Position: Bed, 3 rail up  IP OR SWING BED PT PLAN  Inpatient or Swing Bed: Inpatient  PT Plan  PT Plan: Skilled PT  PT Frequency: 3 times per week  PT Discharge Recommendations: Low intensity level of continued care  Equipment Recommended upon Discharge: Wheeled walker (recommend consistent use for gait safety and endurance)  PT Recommended Transfer Status: Stand by assist  PT - OK to Discharge: Yes (per PT POC)      Subjective   General Visit Information:  General  Reason for Referral: 83 YOM admitted with hypoxia, +RSV  Referred By: ERIK Guerrier  Past Medical History Relevant to Rehab: PMH: AS, Afib, DHF, DM, GERD  Family/Caregiver Present: No  Prior to Session Communication: Bedside nurse  Patient Position Received: Bed, 3 rail up  General Comment: pt sitting up in bed upon arriva,l, cleared for session per RN. Droplet precautions +RSV. 2 L O2 in place. O2 sats 95% at rest and with activity. Recommend use of FWW to assist in energy conservation and improved stability  Home Living:  Home Living  Type of Home: Mobile home  Lives With: Alone  Home Adaptive Equipment: Cane  Home Layout: One level  Home Access: Stairs to enter with rails  Entrance Stairs-Rails:  (one)  Entrance Stairs-Number of Steps: 3  Prior Level of Function:  Prior Function Per Pt/Caregiver  Report  Level of [place]: Independent with ADLs and functional transfers, Independent with homemaking with ambulation  ADL Assistance: Independent  Homemaking Assistance: Independent  Ambulatory Assistance: Independent  Prior Function Comments: son lives close by if needed  Precautions:  Precautions  Medical Precautions: Fall precautions, Oxygen therapy device and L/min (infection precautions)  Vital Signs:       Objective   Pain:  Pain Assessment  Pain Assessment: 0-10  Pain Score: 0 - No pain  Cognition:  Cognition  Overall Cognitive Status:  (at least oriented to self and situation in conversation)    General Assessments:                            Strength  Strength Comments: B LEs >4/5  Strength  Strength Comments: B LEs >4/5                     Dynamic Sitting Balance  Dynamic Sitting-Balance Support: No upper extremity supported  Dynamic Sitting-Balance:  (LE AROM)  Dynamic Sitting-Comments: S/I    Dynamic Standing Balance  Dynamic Standing-Balance Support: No upper extremity supported  Dynamic Standing-Balance:  (mini marches R/L x5)  Dynamic Standing-Comments: increased difficulty noted wthout UE support, improved with use of FWW, SB/S  Functional Assessments:       Bed Mobility  Bed Mobility: Yes  Bed Mobility 1  Bed Mobility 1: Supine to sitting, Sitting to supine  Level of Assistance 1: Independent    Transfers  Transfer: Yes  Transfer 1  Transfer From 1: Sit to  Transfer to 1: Stand  Technique 1: Sit to stand, Stand to sit  Transfer Level of Assistance 1: Close supervision    Ambulation/Gait Training  Ambulation/Gait Training Performed: Yes  Ambulation/Gait Training 1  Surface 1: Level tile  Device 1:  (no DME x10 feet, FWW x30 feet)  Assistance 1:  (CGA without DME, SB/S with FWW)  Quality of Gait 1:  (increased postural sway with instability noted without DME; corrected with use of FWW. Improved endurance also noted with DME)  Comments/Distance (ft) 1: 10 feet x1, 30 feet x1    Outcome  Measures:  [facility] Basic Mobility  Turning from your back to your side while in a flat bed without using bedrails: None  Moving from lying on your back to sitting on the side of a flat bed without using bedrails: None  Moving to and from bed to chair (including a wheelchair): A little  Standing up from a chair using your arms (e.g. wheelchair or bedside chair): A little  To walk in hospital room: A little  Climbing 3-5 steps with railing: A little  Basic Mobility - Total Score: 20    Encounter Problems       Encounter Problems (Active)       Balance       Pt will demo static/dynamic standing balance x5+ minutes with U to no  UE support and S/I to improve stability and decrease falls        Start:  01/03/24    Expected End:  01/17/24               Mobility       3x75 feet with/without use of DME and S/I assist  necessary to initiate return to PLOF        Start:  01/03/24    Expected End:  01/17/24            X15+ reps ther ex to increase general strength and improve functional independence         Start:  01/03/24    Expected End:  01/17/24               Pain - Adult          Safety       pt will tolerate 30 minutes with 1 rest breaks and maintaining O2 sats >88% on baseline room air necessary for improved functional endurance         Start:  01/03/24    Expected End:  01/17/24               Transfers       Pt will complete all functional transfers with S/I necessary to initiate return to PLOF         Start:  01/03/24    Expected End:  01/17/24                   Education Documentation  Mobility Training, taught by Danyell Lieberman PT at 1/3/2024 11:34 AM.  Learner: Patient  Readiness: Acceptance  Method: Explanation  Response: Verbalizes Understanding    Education Comments  No comments found.

## 2024-01-03 NOTE — PROGRESS NOTES
Zackary Shah is a 83 y.o. male on day 2 of admission presenting with Hypoxia.    Subjective   Interval History: no fever, less sob and cough        Review of Systems    Objective   Range of Vitals (last 24 hours)  Heart Rate:  [72-96]   Temp:  [35.7 °C (96.3 °F)-36.2 °C (97.2 °F)]   Resp:  [18-22]   BP: ()/(59-74)   Weight:  [100 kg (220 lb 14.4 oz)]   SpO2:  [88 %-96 %]   Daily Weight  01/03/24 : 100 kg (220 lb 14.4 oz)    Body mass index is 30.81 kg/m².    Physical Exam  Constitutional:       Appearance: Normal appearance.   HENT:      Head: Normocephalic and atraumatic.      Mouth/Throat:      Mouth: Mucous membranes are moist.      Pharynx: Oropharynx is clear.   Eyes:      Pupils: Pupils are equal, round, and reactive to light.   Cardiovascular:      Rate and Rhythm: Normal rate and regular rhythm.      Heart sounds: Normal heart sounds.   Pulmonary:      Effort: Pulmonary effort is normal.      Breath sounds: Normal breath sounds.   Abdominal:      General: Abdomen is flat. Bowel sounds are normal.      Palpations: Abdomen is soft.   Musculoskeletal:      Cervical back: Normal range of motion.   Neurological:      Mental Status: He is alert.         Antibiotics  ipratropium-albuteroL (Duo-Neb) 0.5-2.5 mg/3 mL nebulizer solution 3 mL  methylPREDNISolone sod succinate (SOLU-Medrol) injection 125 mg  magnesium sulfate IV 2 g  cefTRIAXone (Rocephin) IVPB 1 g  azithromycin (Zithromax) in dextrose 5 % in water (D5W) 250 mL  mg  enoxaparin (Lovenox) syringe 40 mg  acetaminophen (Tylenol) tablet 650 mg  ondansetron (Zofran) tablet 4 mg  ondansetron (Zofran) injection 4 mg  melatonin tablet 3 mg  docusate sodium (Colace) capsule 100 mg  azithromycin (Zithromax) in dextrose 5 % in water (D5W) 250 mL  mg  cefTRIAXone (Rocephin) 2 g IV in dextrose 5% 50 mL  albuterol 90 mcg/actuation inhaler 2 puff  furosemide (Lasix) tablet 40 mg  magnesium oxide (Mag-Ox) tablet 400 mg  metoprolol tartrate  "(Lopressor) tablet 25 mg  pantoprazole (ProtoNix) EC tablet 40 mg  psyllium (Metamucil) 3.4 gram packet 1 packet  warfarin (Coumadin) tablet 6 mg  dextrose 50 % injection 25 g  glucagon (Glucagen) injection 1 mg  dextrose 10 % in water (D10W) infusion  insulin lispro (HumaLOG) injection 0-5 Units  warfarin (Coumadin) tablet 6 mg  guaiFENesin (Mucinex) 12 hr tablet 1,200 mg  menthol-zinc oxide (Calmoseptine - Risamine) 0.44-20.6 % ointment 1 Application  cefTRIAXone (Rocephin) 2 g IV in dextrose 5% 50 mL  ipratropium-albuteroL (Duo-Neb) 0.5-2.5 mg/3 mL nebulizer solution 3 mL      Relevant Results  Labs  Results from last 72 hours   Lab Units 01/02/24  0606 01/01/24  1857   WBC AUTO x10*3/uL 11.6* 13.2*   HEMOGLOBIN g/dL 13.8 14.4   HEMATOCRIT % 42.2 44.3   PLATELETS AUTO x10*3/uL 276 322   NEUTROS PCT AUTO %  --  78.8   LYMPHS PCT AUTO %  --  11.3   MONOS PCT AUTO %  --  8.8   EOS PCT AUTO %  --  0.3     Results from last 72 hours   Lab Units 01/02/24  0606 01/01/24  1857   SODIUM mmol/L 137 141   POTASSIUM mmol/L 3.9 3.5   CHLORIDE mmol/L 103 99   CO2 mmol/L 26 28   BUN mg/dL 18 20   CREATININE mg/dL 0.98 1.16   GLUCOSE mg/dL 154* 122*   CALCIUM mg/dL 8.6 8.7   ANION GAP mmol/L 12 18   EGFR mL/min/1.73m*2 77 62     Results from last 72 hours   Lab Units 01/01/24  1857   ALK PHOS U/L 91   BILIRUBIN TOTAL mg/dL 1.7*   PROTEIN TOTAL g/dL 7.5   ALT U/L 28   AST U/L 22   ALBUMIN g/dL 3.7     Estimated Creatinine Clearance: 68.8 mL/min (by C-G formula based on SCr of 0.98 mg/dL).  No results found for: \"CRP\"  Microbiology  Reviewed  Imaging  Reviewed        Assessment/Plan     Likely postviral pneumonia     Recommendations :  Continue Rocephin     I spent minutes in the professional and overall care of this patient.      Daniel Leal MD  "

## 2024-01-03 NOTE — PROGRESS NOTES
Zackary Shah is a 83 y.o. male on day 2 of admission presenting with Hypoxia.      Subjective   Patient reports overall improvement in dyspnea however reports continued dyspnea with exertion.       Objective     Last Recorded Vitals  /70   Pulse 83   Temp 35.9 °C (96.6 °F)   Resp 22   Wt 100 kg (220 lb 14.4 oz)   SpO2 96%   Intake/Output last 3 Shifts:    Intake/Output Summary (Last 24 hours) at 1/3/2024 1107  Last data filed at 1/3/2024 0549  Gross per 24 hour   Intake 727 ml   Output 800 ml   Net -73 ml         Admission Weight  Weight: 105 kg (231 lb) (01/01/24 1849)    Daily Weight  01/03/24 : 100 kg (220 lb 14.4 oz)    Image Results  XR chest 1 view  Narrative: Interpreted By:  Miguel Ángel Bullock,   STUDY:  XR CHEST 1 VIEW;  1/1/2024 7:53 pm      INDICATION:  Signs/Symptoms:sob.      COMPARISON:  None.      ACCESSION NUMBER(S):  [id number]      ORDERING CLINICIAN:  NARAYAN MARTIN      FINDINGS:  There is cardiomegaly. Opacities in the right mid and lower lung  concerning for infiltrate. Mild left basilar atelectasis. Trace left  pleural effusion not excluded. No pneumothorax.      Impression: Opacities in the right mid and lower lung concerning for infiltrate;  short-term progress imaging recommended to assure resolution.      MACRO:  None      Signed by: Miguel Ángel Bullock 1/1/2024 8:04 PM  Dictation workstation:   [id number]      Physical Exam  Constitutional:       General: He is not in acute distress.     Appearance: He is obese. He is ill-appearing, nontoxic-appearing & + +diaphoretic.   HENT:      Head: Normocephalic and atraumatic.      Mouth/Throat:      Mouth: Mucous membranes are moist.      Pharynx: Oropharynx is clear.   Eyes:      Extraocular Movements: Extraocular movements intact.      Conjunctiva/sclera: Conjunctivae normal.   Cardiovascular:      Rate and Rhythm: Normal rate. Rhythm irregular.      Pulses: Normal pulses.      Heart sounds: Normal heart sounds. No murmur heard.      Comments: NO JVD  Pulmonary:      Effort: Pulmonary effort is normal. No respiratory distress. Breath sounds: have improved from yesterday  Abdominal:      General: Bowel sounds are normal. There is no distension.      Palpations: Abdomen is soft.      Tenderness: There is no abdominal tenderness. There is no guarding.   Musculoskeletal:         General: No swelling. Decreased range of motion. Can barely lift legs off the bed. Strength BLE 4/5.     Right lower leg: No edema.      Left lower leg: No edema.   Skin:     General: Skin is dry.      Findings: No erythema or rash.   Neurological:      Mental Status: He is alert and oriented to person, place, and time. Mental status is at baseline.      Motor: No weakness.   Psychiatric:         Mood and Affect: Mood normal.             Assessment/Plan      Zackary Shah is a 83 y.o. male with a pertinent medical history of aortic stenosis, atrial fibrillation and diastolic heart failure (EF 68% December 2023; diastolic function not evaluated on this echo, previous echo showed impairment of diastolic function with a EF of 55%) who presenting to [facility] ER with ongoing complaints of generalized weakness, worsening cough that was wet sounding with yellow mucus and increased dyspnea with diaphoresis is found to have acute hypoxic respiratory failure due to pneumonia    Acute hypoxic respiratory failure due to pneumonia  Worsening hypoxia, increase O2 demand overnight  Will repeat CXR, follow up with BNP and schedule Duonebs  Continue to wean O2 as toleralted   Continue azithromycin and Rocephin  Continue albuterol as needed  ID following    Atrial fibrillation/subtherapeutic INR  Continue Coumadin  Monitor INR  Monitor on telemetry    Moderate to severe aortic stenosis/diastolic heart failure  Appears compensated  Follow daily weights, strict I's and O's continue low-sodium diet  Continue Lasix and metoprolol\  Follow up with BNP as mentioned above    Type 2 diabetes  mellitus  Last hemoglobin A1c was 6  Holding metformin  Continue insulin sliding scale with Accu-Cheks    Wound on buttocks  Wound care consulted    Constipation  Continue bowel regimen    GERD  PPI    DVT prophylaxis  SCDs and Coumadin                Jesus Freeman DO

## 2024-01-03 NOTE — CARE PLAN
The patient's goals for the shift include  to be able to sleep    The clinical goals for the shift include to not be as short of breath.     Over the shift, the patient did not make progress toward the following goals. Barriers to progression include pt was still SOB walking. Recommendations to address these barriers include maybe give more lasix, recheck chest xray.     Problem: Respiratory  Goal: Verbalize decreased shortness of breath this shift  Outcome: Not Progressing

## 2024-01-03 NOTE — CONSULTS
Wound Care Consult     Visit Date: 1/2/2024      Patient Name: Zackary Shah         MRN: [id number]           YOB: 1940     Reason for Consult: redness to buttock        Wound History: patient cleans self and uses ointment at home.     Pertinent Labs:   Albumin   Date Value Ref Range Status   01/01/2024 3.7 3.4 - 5.0 g/dL Final         Wound Team Summary Assessment: small patches of dry skin at proximal end of gluteal crevice, in crevice, reddened angry appearing skin.     Wound Team Plan: cleanse and soothe with Calmoseptine/zinc based ointment to protect from moisture.  Orders obtained.  Nursing updated.    Noelle Perez RN  1/2/2024  8:26 PM

## 2024-01-03 NOTE — NURSING NOTE
1930: assumed pt care    1/3/: pt is SOB on exertion to bathroom, no CP, gave pt albuterol inhaler but is still very wheezy laying in bed. RA 93%, RR 22, HR 72, /74. Let Dr. Park know, no further orders    1/3/: pt was 88% RA, still sounds wet, applied 2 L NC oxygen, pt 93%, let DR. Park know

## 2024-01-04 LAB
ALBUMIN SERPL BCP-MCNC: 3.1 G/DL (ref 3.4–5)
ANION GAP SERPL CALC-SCNC: 12 MMOL/L (ref 10–20)
BUN SERPL-MCNC: 19 MG/DL (ref 6–23)
CALCIUM SERPL-MCNC: 8.1 MG/DL (ref 8.6–10.3)
CHLORIDE SERPL-SCNC: 104 MMOL/L (ref 98–107)
CO2 SERPL-SCNC: 27 MMOL/L (ref 21–32)
CREAT SERPL-MCNC: 1 MG/DL (ref 0.5–1.3)
ERYTHROCYTE [DISTWIDTH] IN BLOOD BY AUTOMATED COUNT: 14 % (ref 11.5–14.5)
GFR SERPL CREATININE-BSD FRML MDRD: 75 ML/MIN/1.73M*2
GLUCOSE BLD MANUAL STRIP-MCNC: 103 MG/DL (ref 74–99)
GLUCOSE BLD MANUAL STRIP-MCNC: 130 MG/DL (ref 74–99)
GLUCOSE BLD MANUAL STRIP-MCNC: 175 MG/DL (ref 74–99)
GLUCOSE BLD MANUAL STRIP-MCNC: 94 MG/DL (ref 74–99)
GLUCOSE SERPL-MCNC: 100 MG/DL (ref 74–99)
HCT VFR BLD AUTO: 40.7 % (ref 41–52)
HGB BLD-MCNC: 13.3 G/DL (ref 13.5–17.5)
INR PPP: 2.7 (ref 0.9–1.1)
MCH RBC QN AUTO: 30.4 PG (ref 26–34)
MCHC RBC AUTO-ENTMCNC: 32.7 G/DL (ref 32–36)
MCV RBC AUTO: 93 FL (ref 80–100)
NRBC BLD-RTO: 0 /100 WBCS (ref 0–0)
PHOSPHATE SERPL-MCNC: 3.4 MG/DL (ref 2.5–4.9)
PLATELET # BLD AUTO: 316 X10*3/UL (ref 150–450)
POTASSIUM SERPL-SCNC: 3.5 MMOL/L (ref 3.5–5.3)
PROCALCITONIN SERPL-MCNC: 0.2 NG/ML
PROTHROMBIN TIME: 30.2 SECONDS (ref 9.8–12.8)
RBC # BLD AUTO: 4.37 X10*6/UL (ref 4.5–5.9)
SODIUM SERPL-SCNC: 139 MMOL/L (ref 136–145)
WBC # BLD AUTO: 8.7 X10*3/UL (ref 4.4–11.3)

## 2024-01-04 PROCEDURE — 99233 SBSQ HOSP IP/OBS HIGH 50: CPT | Performed by: FAMILY MEDICINE

## 2024-01-04 PROCEDURE — 94760 N-INVAS EAR/PLS OXIMETRY 1: CPT

## 2024-01-04 PROCEDURE — 1100000001 HC PRIVATE ROOM DAILY

## 2024-01-04 PROCEDURE — 2500000002 HC RX 250 W HCPCS SELF ADMINISTERED DRUGS (ALT 637 FOR MEDICARE OP, ALT 636 FOR OP/ED): Performed by: FAMILY MEDICINE

## 2024-01-04 PROCEDURE — 2500000004 HC RX 250 GENERAL PHARMACY W/ HCPCS (ALT 636 FOR OP/ED): Performed by: FAMILY MEDICINE

## 2024-01-04 PROCEDURE — 82947 ASSAY GLUCOSE BLOOD QUANT: CPT

## 2024-01-04 PROCEDURE — 36415 COLL VENOUS BLD VENIPUNCTURE: CPT | Performed by: INTERNAL MEDICINE

## 2024-01-04 PROCEDURE — 80069 RENAL FUNCTION PANEL: CPT | Performed by: FAMILY MEDICINE

## 2024-01-04 PROCEDURE — 2500000001 HC RX 250 WO HCPCS SELF ADMINISTERED DRUGS (ALT 637 FOR MEDICARE OP): Performed by: NURSE PRACTITIONER

## 2024-01-04 PROCEDURE — 97116 GAIT TRAINING THERAPY: CPT | Mod: GP,CQ

## 2024-01-04 PROCEDURE — 84145 PROCALCITONIN (PCT): CPT | Mod: GEALAB | Performed by: INTERNAL MEDICINE

## 2024-01-04 PROCEDURE — 94668 MNPJ CHEST WALL SBSQ: CPT

## 2024-01-04 PROCEDURE — 85610 PROTHROMBIN TIME: CPT | Performed by: FAMILY MEDICINE

## 2024-01-04 PROCEDURE — 36415 COLL VENOUS BLD VENIPUNCTURE: CPT | Performed by: FAMILY MEDICINE

## 2024-01-04 PROCEDURE — 94640 AIRWAY INHALATION TREATMENT: CPT

## 2024-01-04 PROCEDURE — 2500000002 HC RX 250 W HCPCS SELF ADMINISTERED DRUGS (ALT 637 FOR MEDICARE OP, ALT 636 FOR OP/ED): Performed by: NURSE PRACTITIONER

## 2024-01-04 PROCEDURE — 85027 COMPLETE CBC AUTOMATED: CPT | Performed by: FAMILY MEDICINE

## 2024-01-04 PROCEDURE — 97110 THERAPEUTIC EXERCISES: CPT | Mod: GP,CQ

## 2024-01-04 PROCEDURE — 2500000004 HC RX 250 GENERAL PHARMACY W/ HCPCS (ALT 636 FOR OP/ED): Performed by: NURSE PRACTITIONER

## 2024-01-04 RX ORDER — FUROSEMIDE 10 MG/ML
40 INJECTION INTRAMUSCULAR; INTRAVENOUS ONCE
Status: COMPLETED | OUTPATIENT
Start: 2024-01-04 | End: 2024-01-04

## 2024-01-04 RX ADMIN — IPRATROPIUM BROMIDE AND ALBUTEROL SULFATE 3 ML: 2.5; .5 SOLUTION RESPIRATORY (INHALATION) at 07:54

## 2024-01-04 RX ADMIN — PANTOPRAZOLE SODIUM 40 MG: 40 TABLET, DELAYED RELEASE ORAL at 06:27

## 2024-01-04 RX ADMIN — CEFTRIAXONE SODIUM 2 G: 2 INJECTION, SOLUTION INTRAVENOUS at 21:06

## 2024-01-04 RX ADMIN — METOPROLOL TARTRATE 25 MG: 25 TABLET, FILM COATED ORAL at 09:43

## 2024-01-04 RX ADMIN — FUROSEMIDE 40 MG: 40 TABLET ORAL at 09:43

## 2024-01-04 RX ADMIN — GUAIFENESIN 1200 MG: 600 TABLET ORAL at 09:43

## 2024-01-04 RX ADMIN — IPRATROPIUM BROMIDE AND ALBUTEROL SULFATE 3 ML: 2.5; .5 SOLUTION RESPIRATORY (INHALATION) at 13:07

## 2024-01-04 RX ADMIN — Medication 3 MG: at 00:39

## 2024-01-04 RX ADMIN — Medication 400 MG: at 09:43

## 2024-01-04 RX ADMIN — ACETAMINOPHEN 650 MG: 325 TABLET ORAL at 09:47

## 2024-01-04 RX ADMIN — WARFARIN SODIUM 6 MG: 3 TABLET ORAL at 16:58

## 2024-01-04 RX ADMIN — Medication 1 APPLICATION: at 20:00

## 2024-01-04 RX ADMIN — Medication 3 MG: at 20:49

## 2024-01-04 RX ADMIN — Medication 1 APPLICATION: at 09:48

## 2024-01-04 RX ADMIN — FUROSEMIDE 40 MG: 10 INJECTION, SOLUTION INTRAMUSCULAR; INTRAVENOUS at 11:37

## 2024-01-04 RX ADMIN — ACETAMINOPHEN 650 MG: 325 TABLET ORAL at 16:49

## 2024-01-04 RX ADMIN — IPRATROPIUM BROMIDE AND ALBUTEROL SULFATE 3 ML: 2.5; .5 SOLUTION RESPIRATORY (INHALATION) at 19:51

## 2024-01-04 RX ADMIN — DOCUSATE SODIUM 100 MG: 100 CAPSULE, LIQUID FILLED ORAL at 09:43

## 2024-01-04 RX ADMIN — INSULIN LISPRO 1 UNITS: 100 INJECTION, SOLUTION INTRAVENOUS; SUBCUTANEOUS at 20:44

## 2024-01-04 RX ADMIN — ACETAMINOPHEN 650 MG: 325 TABLET ORAL at 20:45

## 2024-01-04 ASSESSMENT — COGNITIVE AND FUNCTIONAL STATUS - GENERAL
DAILY ACTIVITIY SCORE: 20
DRESSING REGULAR LOWER BODY CLOTHING: A LITTLE
WALKING IN HOSPITAL ROOM: A LITTLE
DRESSING REGULAR UPPER BODY CLOTHING: A LITTLE
MOVING FROM LYING ON BACK TO SITTING ON SIDE OF FLAT BED WITH BEDRAILS: A LITTLE
STANDING UP FROM CHAIR USING ARMS: A LITTLE
MOVING TO AND FROM BED TO CHAIR: A LITTLE
STANDING UP FROM CHAIR USING ARMS: A LITTLE
TOILETING: A LITTLE
TURNING FROM BACK TO SIDE WHILE IN FLAT BAD: A LITTLE
HELP NEEDED FOR BATHING: A LITTLE
WALKING IN HOSPITAL ROOM: A LITTLE
CLIMB 3 TO 5 STEPS WITH RAILING: A LITTLE
MOBILITY SCORE: 20
MOBILITY SCORE: 18
MOVING TO AND FROM BED TO CHAIR: A LITTLE
CLIMB 3 TO 5 STEPS WITH RAILING: A LITTLE

## 2024-01-04 ASSESSMENT — PAIN SCALES - GENERAL
PAINLEVEL_OUTOF10: 0 - NO PAIN
PAINLEVEL_OUTOF10: 3

## 2024-01-04 ASSESSMENT — PAIN SCALES - WONG BAKER: WONGBAKER_NUMERICALRESPONSE: NO HURT

## 2024-01-04 ASSESSMENT — PAIN - FUNCTIONAL ASSESSMENT: PAIN_FUNCTIONAL_ASSESSMENT: 0-10

## 2024-01-04 ASSESSMENT — ACTIVITIES OF DAILY LIVING (ADL): LACK_OF_TRANSPORTATION: NO

## 2024-01-04 NOTE — PROGRESS NOTES
Zackary Shah is a 83 y.o. male on day 3 of admission presenting with Hypoxia.    Subjective   Interval History: no fever, less sob and cough        Review of Systems    Objective   Range of Vitals (last 24 hours)  Heart Rate:  [83-92]   Temp:  [35.8 °C (96.4 °F)-36.2 °C (97.2 °F)]   Resp:  [18-22]   BP: (108-120)/(62-86)   Weight:  [101 kg (223 lb 1.7 oz)]   SpO2:  [90 %-96 %]   Daily Weight  01/04/24 : 101 kg (223 lb 1.7 oz)    Body mass index is 31.12 kg/m².    Physical Exam  Constitutional:       Appearance: Normal appearance.   HENT:      Head: Normocephalic and atraumatic.      Mouth/Throat:      Mouth: Mucous membranes are moist.      Pharynx: Oropharynx is clear.   Eyes:      Pupils: Pupils are equal, round, and reactive to light.   Cardiovascular:      Rate and Rhythm: Normal rate and regular rhythm.      Heart sounds: Normal heart sounds.   Pulmonary:      Effort: Pulmonary effort is normal.      Breath sounds: Normal breath sounds.   Abdominal:      General: Abdomen is flat. Bowel sounds are normal.      Palpations: Abdomen is soft.   Musculoskeletal:      Cervical back: Normal range of motion.   Neurological:      Mental Status: He is alert.         Antibiotics  ipratropium-albuteroL (Duo-Neb) 0.5-2.5 mg/3 mL nebulizer solution 3 mL  methylPREDNISolone sod succinate (SOLU-Medrol) injection 125 mg  magnesium sulfate IV 2 g  cefTRIAXone (Rocephin) IVPB 1 g  azithromycin (Zithromax) in dextrose 5 % in water (D5W) 250 mL  mg  enoxaparin (Lovenox) syringe 40 mg  acetaminophen (Tylenol) tablet 650 mg  ondansetron (Zofran) tablet 4 mg  ondansetron (Zofran) injection 4 mg  melatonin tablet 3 mg  docusate sodium (Colace) capsule 100 mg  azithromycin (Zithromax) in dextrose 5 % in water (D5W) 250 mL  mg  cefTRIAXone (Rocephin) 2 g IV in dextrose 5% 50 mL  albuterol 90 mcg/actuation inhaler 2 puff  furosemide (Lasix) tablet 40 mg  magnesium oxide (Mag-Ox) tablet 400 mg  metoprolol tartrate (Lopressor)  "tablet 25 mg  pantoprazole (ProtoNix) EC tablet 40 mg  psyllium (Metamucil) 3.4 gram packet 1 packet  warfarin (Coumadin) tablet 6 mg  dextrose 50 % injection 25 g  glucagon (Glucagen) injection 1 mg  dextrose 10 % in water (D10W) infusion  insulin lispro (HumaLOG) injection 0-5 Units  warfarin (Coumadin) tablet 6 mg  guaiFENesin (Mucinex) 12 hr tablet 1,200 mg  menthol-zinc oxide (Calmoseptine - Risamine) 0.44-20.6 % ointment 1 Application  cefTRIAXone (Rocephin) 2 g IV in dextrose 5% 50 mL  ipratropium-albuteroL (Duo-Neb) 0.5-2.5 mg/3 mL nebulizer solution 3 mL      Relevant Results  Labs  Results from last 72 hours   Lab Units 01/04/24 0619 01/02/24 0606 01/01/24  1857   WBC AUTO x10*3/uL 8.7 11.6* 13.2*   HEMOGLOBIN g/dL 13.3* 13.8 14.4   HEMATOCRIT % 40.7* 42.2 44.3   PLATELETS AUTO x10*3/uL 316 276 322   NEUTROS PCT AUTO %  --   --  78.8   LYMPHS PCT AUTO %  --   --  11.3   MONOS PCT AUTO %  --   --  8.8   EOS PCT AUTO %  --   --  0.3       Results from last 72 hours   Lab Units 01/04/24 0619 01/02/24  0606 01/01/24  1857   SODIUM mmol/L 139 137 141   POTASSIUM mmol/L 3.5 3.9 3.5   CHLORIDE mmol/L 104 103 99   CO2 mmol/L 27 26 28   BUN mg/dL 19 18 20   CREATININE mg/dL 1.00 0.98 1.16   GLUCOSE mg/dL 100* 154* 122*   CALCIUM mg/dL 8.1* 8.6 8.7   ANION GAP mmol/L 12 12 18   EGFR mL/min/1.73m*2 75 77 62   PHOSPHORUS mg/dL 3.4  --   --        Results from last 72 hours   Lab Units 01/04/24 0619 01/01/24  1857   ALK PHOS U/L  --  91   BILIRUBIN TOTAL mg/dL  --  1.7*   PROTEIN TOTAL g/dL  --  7.5   ALT U/L  --  28   AST U/L  --  22   ALBUMIN g/dL 3.1* 3.7       Estimated Creatinine Clearance: 67.8 mL/min (by C-G formula based on SCr of 1 mg/dL).  No results found for: \"CRP\"  Microbiology  Reviewed  Imaging  Reviewed        Assessment/Plan     Likely postviral pneumonia     Recommendations :  Continue Rocephin  Chest PT / incentive spirometry     I spent minutes in the professional and overall care of this " patient.      Daniel Leal MD

## 2024-01-04 NOTE — PROGRESS NOTES
"Physical Therapy    Physical Therapy Treatment    Patient Name: Zackary Shah  MRN: [id number]  Today's Date: 1/4/2024  Time Calculation  Start Time: 1440  Stop Time: 1504  Time Calculation (min): 24 min       Assessment/Plan   PT Assessment  PT Assessment Results: Decreased strength, Decreased endurance, Impaired balance, Decreased mobility  End of Session Communication: Bedside nurse  End of Session Patient Position: Up in chair  PT Plan  Inpatient/Swing Bed or Outpatient: Inpatient  PT Plan  PT Plan: Skilled PT  PT Frequency: 3 times per week  PT Discharge Recommendations: Low intensity level of continued care  Equipment Recommended upon Discharge: Wheeled walker  PT Recommended Transfer Status: Stand by assist  PT - OK to Discharge: Yes      General Visit Information:   PT  Visit  PT Received On: 01/04/24  General  Reason for Referral: 83 YOM admitted with hypoxia, +RSV  Referred By: ERIK Guerrier  Past Medical History Relevant to Rehab: PMH: AS, Afib, DHF, DM, GERD  Missed Visit: No  Family/Caregiver Present: No  Prior to Session Communication: Bedside nurse  Patient Position Received: Bed, 2 rail up  General Comment:  (AXOX3, in bed, on droplet precautiosn for \"RSV\" stated RN, 02 at 1L, no po2 monitor in room, pleasant and cooperative)    Subjective   Precautions:  Precautions  Medical Precautions: Fall precautions, Oxygen therapy device and L/min  Vital Signs:       Objective   Pain:  Pain Assessment  Pain Assessment: 0-10  Pain Score: 0 - No pain  Cognition:  Cognition  Overall Cognitive Status: Within Functional Limits  Attention: Within Functional Limits  Problem Solving: Within Functional Limits  Postural Control:     Extremity/Trunk Assessments:    Activity Tolerance:  Activity Tolerance  Endurance: Tolerates 10 - 20 min exercise with multiple rests  Treatments:  Therapeutic Exercise  Therapeutic Exercise Performed: Yes  Therapeutic Exercise Activity 1: sitting heel toe raises, long arc quads x10 reps, standing " with bilat UE support, toe raises, marches x10 reps each. SBA/VC              Bed Mobility  Bed Mobility: Yes  Bed Mobility 1  Bed Mobility 1: Supine to sitting, Sitting to supine, Rolling right (S/I assist)    Ambulation/Gait Training  Ambulation/Gait Training Performed: Yes  Ambulation/Gait Training 1  Surface 1: Level tile  Device 1: No device (occasional furniture support)  Assistance 1:  (SBA VC for safety and technique, asssist with 02 tubing)  Quality of Gait 1: Inconsistent stride length, Decreased step length, Wide base of support (slightly unsteady, no LOB, able to right self)  Comments/Distance (ft) 1: 35 ft without a device, SB/S  Transfers  Transfer: Yes  Transfer 1  Transfer From 1: Sit to, Stand to  Technique 1: Sit to stand, Stand to sit  Transfer Level of Assistance 1:  (SBA x1)    Outcome Measures:  [facility] Basic Mobility  Turning from your back to your side while in a flat bed without using bedrails: A little  Moving from lying on your back to sitting on the side of a flat bed without using bedrails: A little  Moving to and from bed to chair (including a wheelchair): A little  Standing up from a chair using your arms (e.g. wheelchair or bedside chair): A little  To walk in hospital room: A little  Climbing 3-5 steps with railing: A little  Basic Mobility - Total Score: 18    Education Documentation  Mobility Training, taught by Kassandra Amaya PTA at 1/4/2024  4:23 PM.  Learner: Patient  Readiness: Eager  Method: Explanation  Response: Needs Reinforcement    Education Comments  No comments found.        OP EDUCATION:       Encounter Problems       Encounter Problems (Active)       Balance       Pt will demo static/dynamic standing balance x5+ minutes with U to no  UE support and S/I to improve stability and decrease falls  (Progressing)       Start:  01/03/24    Expected End:  01/17/24               Mobility       3x75 feet with/without use of DME and S/I assist  necessary to initiate return to PLOF   (Progressing)       Start:  01/03/24    Expected End:  01/17/24            X15+ reps ther ex to increase general strength and improve functional independence   (Progressing)       Start:  01/03/24    Expected End:  01/17/24               Pain - Adult          Safety       pt will tolerate 30 minutes with 1 rest breaks and maintaining O2 sats >88% on baseline room air necessary for improved functional endurance         Start:  01/03/24    Expected End:  01/17/24               Transfers       Pt will complete all functional transfers with S/I necessary to initiate return to PLOF   (Progressing)       Start:  01/03/24    Expected End:  01/17/24

## 2024-01-04 NOTE — CARE PLAN
The patient's goals for the shift include      The clinical goals for the shift include progressing      Problem: Pain - Adult  Goal: Verbalizes/displays adequate comfort level or baseline comfort level  Outcome: Progressing     Problem: Safety - Adult  Goal: Free from fall injury  Outcome: Progressing     Problem: Discharge Planning  Goal: Discharge to home or other facility with appropriate resources  Outcome: Progressing     Problem: Chronic Conditions and Co-morbidities  Goal: Patient's chronic conditions and co-morbidity symptoms are monitored and maintained or improved  Outcome: Progressing     Problem: Diabetes  Goal: Achieve decreasing blood glucose levels by end of shift  Outcome: Progressing  Goal: Increase stability of blood glucose readings by end of shift  Outcome: Progressing  Goal: Decrease in ketones present in urine by end of shift  Outcome: Progressing  Goal: Maintain electrolyte levels within acceptable range throughout shift  Outcome: Progressing  Goal: Maintain glucose levels >70mg/dl to <250mg/dl throughout shift  Outcome: Progressing  Goal: No changes in neurological exam by end of shift  Outcome: Progressing  Goal: Learn about and adhere to nutrition recommendations by end of shift  Outcome: Progressing  Goal: Vital signs within normal range for age by end of shift  Outcome: Progressing  Goal: Increase self care and/or family involovement by end of shift  Outcome: Progressing  Goal: Receive DSME education by end of shift  Outcome: Progressing     Problem: Respiratory  Goal: Clear secretions with interventions this shift  Outcome: Progressing  Goal: Minimize anxiety/maximize coping throughout shift  Outcome: Progressing  Goal: Minimal/no exertional discomfort or dyspnea this shift  Outcome: Progressing  Goal: No signs of respiratory distress (eg. Use of accessory muscles. Peds grunting)  Outcome: Progressing  Goal: Patent airway maintained this shift  Outcome: Progressing  Goal: Tolerate  pulmonary toileting this shift  Outcome: Progressing  Goal: Verbalize decreased shortness of breath this shift  Outcome: Progressing  Goal: Wean oxygen to maintain O2 saturation per order/standard this shift  Outcome: Progressing  Goal: Increase self care and/or family involvement in next 24 hours  Outcome: Progressing

## 2024-01-04 NOTE — PROGRESS NOTES
01/04/24 1021   Discharge Planning   Living Arrangements Alone   Support Systems Children  (son and daughter live near)   Assistance Needed A&OX3; independent with ADLs no assistive devices;drives room air baseline and currenlty on 2L NC   Type of Residence Private residence   Number of Stairs to Enter Residence 3   Number of Stairs Within Residence 0   Do you have animals or pets at home? No   Who is requesting discharge planning? Provider   Home or Post Acute Services In home services   Type of Home Care Services Home nursing visits   Financial Resource Strain   How hard is it for you to pay for the very basics like food, housing, medical care, and heating? Not hard   Housing Stability   In the last 12 months, was there a time when you were not able to pay the mortgage or rent on time? N   In the last 12 months, how many places have you lived? 1   In the last 12 months, was there a time when you did not have a steady place to sleep or slept in a shelter (including now)? N   Transportation Needs   In the past 12 months, has lack of transportation kept you from medical appointments or from getting medications? no   In the past 12 months, has lack of transportation kept you from meetings, work, or from getting things needed for daily living? No        01/05/24 1000   Discharge Planning   Living Arrangements Alone   Support Systems Children  (son and daughter live near)   Assistance Needed A&OX3; independent with ADLs no assistive devices;drives room air baseline and currenlty on 2L NC   Type of Residence Private residence   Number of Stairs to Enter Residence 3   Number of Stairs Within Residence 0   Do you have animals or pets at home? No   Who is requesting discharge planning? Provider   Home or Post Acute Services In home services   Type of Home Care Services Home nursing visits   Financial Resource Strain   How hard is it for you to pay for the very basics like food, housing, medical care, and heating? Not  hard   Housing Stability   In the last 12 months, was there a time when you were not able to pay the mortgage or rent on time? N   In the last 12 months, how many places have you lived? 1   In the last 12 months, was there a time when you did not have a steady place to sleep or slept in a shelter (including now)? N   Transportation Needs   In the past 12 months, has lack of transportation kept you from medical appointments or from getting medications? no   In the past 12 months, has lack of transportation kept you from meetings, work, or from getting things needed for daily living? No     01/04/2024 1022am  Met with patient and he stated he felt much better. Patient remains on supplemental O2 at 2L NC. Patient may need to dc with new home O2. Will continue to follow.

## 2024-01-04 NOTE — PROGRESS NOTES
June 13, 2023       Hardik Blanco MD  [address] [place] [address] [address]  [facility] [id number]  Via In Basket      Patient: Margo Hernandez   YOB: 1988   Date of Visit: 6/13/2023       Dear Dr. Blanco:    Thank you for referring Margo Hernandez to me for evaluation. Below are my notes for this visit with her.    If you have questions, please do not hesitate to call me. I look forward to following your patient along with you.      Sincerely,        Kym Frazier CNP        CC: No Recipients  Kym Frazier CNP  6/13/2023  9:02 AM  Signed    Weight Management Initial Documentation    Subjective:  Margo Hernandez is a 35 year old female who is being seen for obesity management and comorbidities. She first noticed that she was gaining weight during adulthood. Pt referred to [facility] weight management by her pcp, Dr Blanco.    Pt states her physical and mental health has declined since gaining weight.  She has chronic back pain and poor self-esteem. Pt states she started gaining weight 2021. Margo states her guilty pleasure are sweets and she admits to being a grazer/snacker.  She does see a behavioral health specialist weekly for anxiety and depression, along with a psychiatrist every 6 weeks- Dr Burgess. She complains of worsening daytime fatigue and sleepiness. Margo admits to eating only 1-2x/day and eats out 2x/week.  She is seeking assistance with weight loss efforts and overall improved health.       Highest adult weight: 236 lbs  Lowest adult weight: 140lbs  Goal weight:    Did you ever gain more than 20 pounds in less than 3 months? yes.    Have you tried any previous weightloss programs? no    Have you ever taken medication to lose weight? no.      Nutritional History:  Number of times you eat per day 1-2.  What beverages do you drink? coffee with cream & sugar and regular soda    Food triggers:  Eating out  Food cravings: sweets, pasta    Do you get up at night to eat? no. If yes how often?  Zackary Shah is a 83 y.o. male on day 3 of admission presenting with Hypoxia.      Subjective   No acute events overnight       Objective     Last Recorded Vitals  BP 99/69   Pulse 85   Temp 35.8 °C (96.5 °F)   Resp 18   Wt 101 kg (223 lb 1.7 oz)   SpO2 93%   Intake/Output last 3 Shifts:    Intake/Output Summary (Last 24 hours) at 1/4/2024 1024  Last data filed at 1/3/2024 2058  Gross per 24 hour   Intake --   Output 1450 ml   Net -1450 ml         Admission Weight  Weight: 105 kg (231 lb) (01/01/24 1849)    Daily Weight  01/04/24 : 101 kg (223 lb 1.7 oz)    Image Results  XR chest 1 view  Narrative: Interpreted By:  Yuni Childs,   STUDY:  XR CHEST 1 VIEW;      INDICATION:  Signs/Symptoms:worsening hypoxemia.      COMPARISON:  Chest radiograph dated 01/01/2024      ACCESSION NUMBER(S):  [id number]      ORDERING CLINICIAN:  CONSUELO CHILEL      FINDINGS:  Cardiac silhouette is diffusely enlarged. There is interval increase  in bibasilar airspace opacities compared to prior radiograph, more  pronounced on the right compared to the left. There is obliteration  of bilateral costophrenic angles which could be related to pleural  scarring versus pleural effusion. No large pneumothorax within limits  of portable technique. No acute osseous findings.      Impression: 1. Findings concerning for interval increase in bibasilar airspace  opacities which could be related atelectasis versus worsening  pneumonia in appropriate clinical setting.  2. Cardiomegaly with suggestion of small bilateral pleural effusions.  Recommend correlation with fluid status and cardiac function.      Signed by: Yuni Childs 1/3/2024 1:26 PM  Dictation workstation:   [id number]      Physical Exam  Constitutional:       General: He is not in acute distress.     Appearance: He is obese. He is ill-appearing, nontoxic-appearing & + +diaphoretic.   HENT:      Head: Normocephalic and atraumatic.      Mouth/Throat:      Mouth: Mucous  .    Do you have any food intolerances/restrictions: pizza    Current exercise: nothing above normal daily activies    How many hours do you sleep per night? 7-8h.    BMI:  BMI Readings from Last 4 Encounters:   06/13/23 32.67 kg/m²   06/07/23 32.77 kg/m²   12/27/22 35.66 kg/m²   11/29/22 36.34 kg/m²       Blood Pressure / Pulse:  BP Readings from Last 4 Encounters:   06/13/23 108/77   06/07/23 110/71   12/27/22 104/70   11/29/22 114/76      Pulse Readings from Last 4 Encounters:   06/13/23 97   06/07/23 70   12/27/22 78   11/29/22 92        Weight:  Wt Readings from Last 4 Encounters:   06/13/23 91.8 kg (202 lb 6.1 oz)   06/07/23 93.5 kg (206 lb 2.1 oz)   12/27/22 101.8 kg (224 lb 5.1 oz)   11/29/22 103.8 kg (228 lb 13.4 oz)       1. Bipolar affective disorder, current episode mixed, current episode severity unspecified (CMD)    2. Class 1 obesity due to excess calories with serious comorbidity and body mass index (BMI) of 32.0 to 32.9 in adult    3. Encounter for weight management    4. Malaise and fatigue    5. Vitamin D deficiency       Social History     Tobacco Use   • Smoking status: Every Day     Current packs/day: 0.50     Types: Cigarettes   • Smokeless tobacco: Never   Vaping Use   • Vaping Use: never used   Substance Use Topics   • Alcohol use: Not Currently   • Drug use: Never     Outpatient Medications Marked as Taking for the 6/13/23 encounter (Office Visit) with Kym Frazier CNP   Medication Sig Dispense Refill   • escitalopram (LEXAPRO) 10 MG tablet TAKE 1 TABLET BY MOUTH EVERY DAY WITH THE 20MG TABLET     • amphetamine-dextroamphetamine (ADDERALL) 30 MG tablet Take 30 mg by mouth 2 times daily.     • ARIPiprazole (ABILIFY) 20 MG tablet Take 20 mg by mouth every morning.     • albuterol 108 (90 Base) MCG/ACT inhaler INHALE 1 TO 2 PUFFS BY MOUTH EVERY 4 HOURS AS NEEDED FOR WHEEZING     • ALPRAZolam (XANAX) 1 MG tablet Take 1 tablet by mouth daily as needed (panic). 15 tablet 0   • lamotrigine  membranes are moist.      Pharynx: Oropharynx is clear.   Eyes:      Extraocular Movements: Extraocular movements intact.      Conjunctiva/sclera: Conjunctivae normal.   Cardiovascular:      Rate and Rhythm: Normal rate. Rhythm irregular.      Pulses: Normal pulses.      Heart sounds: Normal heart sounds. No murmur heard.     Comments: NO JVD  Pulmonary:      Effort: Pulmonary effort is normal. No respiratory distress. Breath sounds: have improved from yesterday  Abdominal:      General: Bowel sounds are normal. There is no distension.      Palpations: Abdomen is soft.      Tenderness: There is no abdominal tenderness. There is no guarding.   Musculoskeletal:         General: No swelling. Decreased range of motion. Can barely lift legs off the bed. Strength BLE 4/5.     Right lower leg: No edema.      Left lower leg: No edema.   Skin:     General: Skin is dry.      Findings: No erythema or rash.   Neurological:      Mental Status: He is alert and oriented to person, place, and time. Mental status is at baseline.      Motor: No weakness.   Psychiatric:         Mood and Affect: Mood normal.             Assessment/Plan      Zackary Shah is a 83 y.o. male with a pertinent medical history of aortic stenosis, atrial fibrillation and diastolic heart failure (EF 68% December 2023; diastolic function not evaluated on this echo, previous echo showed impairment of diastolic function with a EF of 55%) who presenting to [facility] ER with ongoing complaints of generalized weakness, worsening cough that was wet sounding with yellow mucus and increased dyspnea with diaphoresis is found to have acute hypoxic respiratory failure due to pneumonia    Acute hypoxic respiratory failure due to pneumonia  O2 demand remains stable, will continue to wean as tolerated  Chest x-ray revealed worsening infiltrates and small B/L pleural effusions  BNP within normal limits  Continue schedule Duonebs  Continue to wean O2 as toleralted   Continue   (LAMICTAL) 200 MG tablet Take 1 tablet by mouth in the morning and 1 tablet in the evening.     • escitalopram (LEXAPRO) 20 MG tablet Take 1.5 tablets by mouth daily.     • fluticasone propionate (FLOVENT HFA) 220 MCG/ACT inhaler Inhale 2 puffs into the lungs in the morning and 2 puffs in the evening. 1 each 1        History Reviewed:  I have reviewed Problem list, Medical History, Surgical History, Family History, Social History, Allergies and Medications listed in her medical record.      Review of Systems:   [] Recent weight loss more than 10 pounds.        [] Difficulty breathing when flat     [x] Recent weight gain more than 10 pounds.        [] Skin rash     [] Acne                                                                  [] Swelling ankles/extremities     [] Snoring                                                              [] Constipation      [] Dysphagia/difficulty swallowing                          [] Nausea/Vomitting     [] Increased appetite                                              [] Gas and bloating     [] Nighttime urination                                              [x] Back pain/aches      [] Dizziness                                                             [x] Weakness/low energy     [] Insomnia                                                              [] Skin rash     [] Shortness of Breath                                             [] cough     [] Chest pain                                                           [] Fainting/blacking out     [] Palpitations                                                          [] Bloating     [] Abdominal pain                                                    [] Diarrhea     [] Food intolerance                                                  [] Indigestion      [] Decreased appetite                                              [] Heartburn     [] Urinary frequency/urgency                                   [] Slow urine     []  Rocephin (Day 3)  Continue albuterol as needed  ID following    Atrial fibrillation/subtherapeutic INR  Continue Coumadin  Monitor INR  Monitor on telemetry    Moderate to severe aortic stenosis/diastolic heart failure  Appears compensated  Net output of 1243  Continue Lasix and metoprolol  Will give lasix 40mg IV x1 and continue PO lasix    Type 2 diabetes mellitus  Last hemoglobin A1c was 6  Holding metformin  Continue insulin sliding scale with Accu-Cheks    Wound on buttocks  Wound care consulted    Constipation  Continue bowel regimen    GERD  PPI    DVT prophylaxis  SCDs and Coumadin                Jesus Freeman DO       Blood in stools                                                      [] Joint pain     [] Muscle aches/pain                                               [] Headaches     [] Seizures                                                               [x] Anxiety     [x] Depression                                                          [] Memory loss     [] Inability to concentrate                                        [] Mood changes     [] Nervousness                                                       [x] Loss of interest     [] Cold intolerance                                                  [] Excessive sweating     [x] Hair changes                                                       [] Blood clots     [] Heat intolerance                                                  [x] Fatigue/tiredness    Depression Screening:  Recent Review Flowsheet Data     Date 12/27/2022    Adult PHQ 2 Score 0    Adult PHQ 2 Interpretation No further screening needed    Little interest or pleasure in activity? Not at all    Feeling down, depressed or hopeless? Not at all          Last Filed STOP-BANG Screening Responses and Score:        Physical Exam:  Vitals:    06/13/23 0811   BP: 108/77   Pulse: 97   SpO2: 100%   Weight: 91.8 kg (202 lb 6.1 oz)   Height: 5' 6\" (1.676 m)      General: Alert, in no acute distress.  Skin: Warm and dry without rash.  HEENT: without  excessive soft tissue.  Neck: circumference is 15\"  Nose: The patient breathes through her nose without alar collapse.  Mouth: Dental occlusion normal.   Tongue: is normal for oral cavity without posterior mounding and appears non-obstructive.  Palate: is normal without tonsillar hypertrophy. The palatal margin is visible behind the tongue base and appears non-obstructive.  Cardiovascular: Regular rate and rhythm without murmur.   Respiratory: Normal respiratory effort.  Abdomen: Soft, nontender without masses, without organomegaly. Waist circumference 38\"  Lower  Extremities: without edema.  Psychiatric: Cooperative. Appropriate mood and affect.    Labs:   All pertinent laboratory results were reviewed.  Additional labs ordered  Lab Results   Component Value Date    SODIUM 139 11/29/2022    POTASSIUM 3.9 11/29/2022    CHLORIDE 108 11/29/2022    CO2 23 11/29/2022    BUN 8 11/29/2022    CREATININE 0.65 11/29/2022    GLUCOSE 83 11/29/2022     Hemoglobin A1C (%)   Date Value   06/07/2023 5.5     TSH (mcUnits/mL)   Date Value   06/07/2023 2.675     Lab Results   Component Value Date    CHOLESTEROL 132 06/07/2023    HDL 37 (L) 06/07/2023    CALCLDL 71 06/07/2023    TRIGLYCERIDE 119 06/07/2023       Assessment/Plan:  Problem List Items Addressed This Visit        Endocrine and Metabolic    Class 1 obesity due to excess calories with serious comorbidity and body mass index (BMI) of 32.0 to 32.9 in adult       Monitor: The patient's  obesity is worsening.  Evaluation: Diagnostic tests ordered, see full progress note.  Assessment/Treatment:  Discussed the patient's BMI.  The BMI is above average. The patient received dietary education, feeding regime and exercise education because they have an above normal BMI..  General weight loss/lifestyle modification strategies discussed (elicit support from others; identify saboteurs; non-food rewards, etc).  Behavioral treatment: stress management.  Diet interventions: diet diary indefinitely.  Informal exercise measures discussed, e.g. taking stairs instead of elevator.  Regular aerobic exercise program discussed.  Condition will be reassessed per progress note         Vitamin D deficiency     Increased fatigue  Not supplemented  Check labs to assess            Health Encounters    Encounter for weight management     See plan            Mental Health    Bipolar disorder (CMD) - Primary     Psychological condition is stable.  Continue current treatment regimen.  Regular aerobic exercise.  continued f/u with specialists  Psychological condition  will be reassessed at the next regular appointment.            Symptoms and Signs    Malaise and fatigue     Worse at current weight  Check labs   Continue with weight loss efforts           Discussed all options available through our [facility] Comprehensive Weight Management program, including, HMR, medical weight management and bariatric surgery.  Patient ask appropriate questions, and all those questions were answered to the best of my ability. Patient verbalized understanding and would like to proceed.    Goals:  -use [employer] track all intake   -use measuring cups and scale for accuracy  -start with protein shakes- to ensure 3 meals per day ( 25-30g protein and less, around 4 carbs)  -have labs done- fasting   -2 days of 15 min ( www.sitandbefit.org)   -protein and produce at every meal   -start probiotic     Follow up for review of data  and weight management treatment plan development.     Kym Frazier [facility]    Follow up:   Return in about 5 weeks (around 7/18/2023).     75 minutes was spent with the patient in counseling on weight loss struggles, reviewing lifestyle changes, establishing weight loss goals and reviewing lab work.   This includes reviewing patient chart and recent provider notes/visits. Proper usage and side effects of medications reviewed and discussed.   Patient education completed on disease process, etiology, and prognosis. All questions answered and patient verbalized understanding of the   conversation/diagnoses and plan of care.    Ref Provider:  Hardik Blanco MD

## 2024-01-05 LAB
ALBUMIN SERPL BCP-MCNC: 3.3 G/DL (ref 3.4–5)
ANION GAP SERPL CALC-SCNC: 14 MMOL/L (ref 10–20)
BUN SERPL-MCNC: 17 MG/DL (ref 6–23)
CALCIUM SERPL-MCNC: 8.5 MG/DL (ref 8.6–10.3)
CHLORIDE SERPL-SCNC: 101 MMOL/L (ref 98–107)
CO2 SERPL-SCNC: 28 MMOL/L (ref 21–32)
CREAT SERPL-MCNC: 1.09 MG/DL (ref 0.5–1.3)
ERYTHROCYTE [DISTWIDTH] IN BLOOD BY AUTOMATED COUNT: 14.1 % (ref 11.5–14.5)
GFR SERPL CREATININE-BSD FRML MDRD: 67 ML/MIN/1.73M*2
GLUCOSE BLD MANUAL STRIP-MCNC: 115 MG/DL (ref 74–99)
GLUCOSE BLD MANUAL STRIP-MCNC: 129 MG/DL (ref 74–99)
GLUCOSE BLD MANUAL STRIP-MCNC: 136 MG/DL (ref 74–99)
GLUCOSE BLD MANUAL STRIP-MCNC: 99 MG/DL (ref 74–99)
GLUCOSE SERPL-MCNC: 99 MG/DL (ref 74–99)
HCT VFR BLD AUTO: 46.1 % (ref 41–52)
HGB BLD-MCNC: 14.3 G/DL (ref 13.5–17.5)
MCH RBC QN AUTO: 30.6 PG (ref 26–34)
MCHC RBC AUTO-ENTMCNC: 31 G/DL (ref 32–36)
MCV RBC AUTO: 99 FL (ref 80–100)
NRBC BLD-RTO: 0 /100 WBCS (ref 0–0)
PHOSPHATE SERPL-MCNC: 3.2 MG/DL (ref 2.5–4.9)
PLATELET # BLD AUTO: 328 X10*3/UL (ref 150–450)
POTASSIUM SERPL-SCNC: 4 MMOL/L (ref 3.5–5.3)
RBC # BLD AUTO: 4.68 X10*6/UL (ref 4.5–5.9)
SODIUM SERPL-SCNC: 139 MMOL/L (ref 136–145)
WBC # BLD AUTO: 9.8 X10*3/UL (ref 4.4–11.3)

## 2024-01-05 PROCEDURE — 82947 ASSAY GLUCOSE BLOOD QUANT: CPT

## 2024-01-05 PROCEDURE — 1100000001 HC PRIVATE ROOM DAILY

## 2024-01-05 PROCEDURE — 85027 COMPLETE CBC AUTOMATED: CPT | Performed by: FAMILY MEDICINE

## 2024-01-05 PROCEDURE — 80069 RENAL FUNCTION PANEL: CPT | Performed by: FAMILY MEDICINE

## 2024-01-05 PROCEDURE — 36415 COLL VENOUS BLD VENIPUNCTURE: CPT | Performed by: FAMILY MEDICINE

## 2024-01-05 PROCEDURE — 2500000005 HC RX 250 GENERAL PHARMACY W/O HCPCS: Performed by: FAMILY MEDICINE

## 2024-01-05 PROCEDURE — 2500000001 HC RX 250 WO HCPCS SELF ADMINISTERED DRUGS (ALT 637 FOR MEDICARE OP): Performed by: NURSE PRACTITIONER

## 2024-01-05 PROCEDURE — 2500000004 HC RX 250 GENERAL PHARMACY W/ HCPCS (ALT 636 FOR OP/ED): Performed by: NURSE PRACTITIONER

## 2024-01-05 PROCEDURE — 2500000002 HC RX 250 W HCPCS SELF ADMINISTERED DRUGS (ALT 637 FOR MEDICARE OP, ALT 636 FOR OP/ED): Performed by: FAMILY MEDICINE

## 2024-01-05 PROCEDURE — 94668 MNPJ CHEST WALL SBSQ: CPT

## 2024-01-05 PROCEDURE — 99233 SBSQ HOSP IP/OBS HIGH 50: CPT | Performed by: FAMILY MEDICINE

## 2024-01-05 PROCEDURE — 94640 AIRWAY INHALATION TREATMENT: CPT

## 2024-01-05 RX ADMIN — Medication 400 MG: at 08:43

## 2024-01-05 RX ADMIN — ACETAMINOPHEN 650 MG: 325 TABLET ORAL at 20:53

## 2024-01-05 RX ADMIN — ACETAMINOPHEN 650 MG: 325 TABLET ORAL at 08:50

## 2024-01-05 RX ADMIN — IPRATROPIUM BROMIDE AND ALBUTEROL SULFATE 3 ML: 2.5; .5 SOLUTION RESPIRATORY (INHALATION) at 14:47

## 2024-01-05 RX ADMIN — Medication 1 L/MIN: at 14:50

## 2024-01-05 RX ADMIN — WARFARIN SODIUM 6 MG: 3 TABLET ORAL at 17:14

## 2024-01-05 RX ADMIN — Medication 1 APPLICATION: at 09:09

## 2024-01-05 RX ADMIN — FUROSEMIDE 40 MG: 40 TABLET ORAL at 08:42

## 2024-01-05 RX ADMIN — Medication 3 MG: at 20:53

## 2024-01-05 RX ADMIN — METOPROLOL TARTRATE 25 MG: 25 TABLET, FILM COATED ORAL at 08:42

## 2024-01-05 RX ADMIN — Medication 2 L/MIN: at 08:36

## 2024-01-05 RX ADMIN — IPRATROPIUM BROMIDE AND ALBUTEROL SULFATE 3 ML: 2.5; .5 SOLUTION RESPIRATORY (INHALATION) at 08:33

## 2024-01-05 RX ADMIN — IPRATROPIUM BROMIDE AND ALBUTEROL SULFATE 3 ML: 2.5; .5 SOLUTION RESPIRATORY (INHALATION) at 20:26

## 2024-01-05 RX ADMIN — PANTOPRAZOLE SODIUM 40 MG: 40 TABLET, DELAYED RELEASE ORAL at 08:42

## 2024-01-05 RX ADMIN — DOCUSATE SODIUM 100 MG: 100 CAPSULE, LIQUID FILLED ORAL at 08:43

## 2024-01-05 RX ADMIN — GUAIFENESIN 1200 MG: 600 TABLET ORAL at 08:49

## 2024-01-05 RX ADMIN — CEFTRIAXONE SODIUM 2 G: 2 INJECTION, SOLUTION INTRAVENOUS at 21:00

## 2024-01-05 RX ADMIN — DOCUSATE SODIUM 100 MG: 100 CAPSULE, LIQUID FILLED ORAL at 20:53

## 2024-01-05 ASSESSMENT — COGNITIVE AND FUNCTIONAL STATUS - GENERAL
MOBILITY SCORE: 18
CLIMB 3 TO 5 STEPS WITH RAILING: A LITTLE
STANDING UP FROM CHAIR USING ARMS: A LITTLE
MOVING TO AND FROM BED TO CHAIR: A LITTLE
TURNING FROM BACK TO SIDE WHILE IN FLAT BAD: A LITTLE
MOVING TO AND FROM BED TO CHAIR: A LITTLE
DRESSING REGULAR LOWER BODY CLOTHING: A LITTLE
WALKING IN HOSPITAL ROOM: A LITTLE
TOILETING: A LITTLE
DAILY ACTIVITIY SCORE: 20
TOILETING: A LITTLE
TURNING FROM BACK TO SIDE WHILE IN FLAT BAD: A LITTLE
DRESSING REGULAR UPPER BODY CLOTHING: A LITTLE
DRESSING REGULAR UPPER BODY CLOTHING: A LITTLE
MOVING FROM LYING ON BACK TO SITTING ON SIDE OF FLAT BED WITH BEDRAILS: A LITTLE
CLIMB 3 TO 5 STEPS WITH RAILING: A LITTLE
HELP NEEDED FOR BATHING: A LITTLE
DRESSING REGULAR LOWER BODY CLOTHING: A LITTLE
MOBILITY SCORE: 18
DAILY ACTIVITIY SCORE: 20
HELP NEEDED FOR BATHING: A LITTLE
WALKING IN HOSPITAL ROOM: A LITTLE
MOVING FROM LYING ON BACK TO SITTING ON SIDE OF FLAT BED WITH BEDRAILS: A LITTLE
STANDING UP FROM CHAIR USING ARMS: A LITTLE

## 2024-01-05 ASSESSMENT — ACTIVITIES OF DAILY LIVING (ADL): LACK_OF_TRANSPORTATION: NO

## 2024-01-05 ASSESSMENT — PAIN SCALES - GENERAL: PAINLEVEL_OUTOF10: 0 - NO PAIN

## 2024-01-05 NOTE — PROGRESS NOTES
Zackary Shah is a 83 y.o. male on day 4 of admission presenting with Hypoxia.      Subjective   Reports improvement in dyspnea.       Objective     Last Recorded Vitals  /73   Pulse 95   Temp 35.8 °C (96.4 °F)   Resp 18   Wt 102 kg (224 lb 6.9 oz)   SpO2 94%   Intake/Output last 3 Shifts:    Intake/Output Summary (Last 24 hours) at 1/5/2024 1303  Last data filed at 1/5/2024 0930  Gross per 24 hour   Intake 120 ml   Output 2150 ml   Net -2030 ml         Admission Weight  Weight: 105 kg (231 lb) (01/01/24 1849)    Daily Weight  01/05/24 : 102 kg (224 lb 6.9 oz)    Image Results  XR chest 1 view  Narrative: Interpreted By:  Yuni Childs,   STUDY:  XR CHEST 1 VIEW;      INDICATION:  Signs/Symptoms:worsening hypoxemia.      COMPARISON:  Chest radiograph dated 01/01/2024      ACCESSION NUMBER(S):  [id number]      ORDERING CLINICIAN:  CONSUELO CHILEL      FINDINGS:  Cardiac silhouette is diffusely enlarged. There is interval increase  in bibasilar airspace opacities compared to prior radiograph, more  pronounced on the right compared to the left. There is obliteration  of bilateral costophrenic angles which could be related to pleural  scarring versus pleural effusion. No large pneumothorax within limits  of portable technique. No acute osseous findings.      Impression: 1. Findings concerning for interval increase in bibasilar airspace  opacities which could be related atelectasis versus worsening  pneumonia in appropriate clinical setting.  2. Cardiomegaly with suggestion of small bilateral pleural effusions.  Recommend correlation with fluid status and cardiac function.      Signed by: Yuni Childs 1/3/2024 1:26 PM  Dictation workstation:   [id number]      Physical Exam  Constitutional:       General: He is not in acute distress.     Appearance: He is obese. He is ill-appearing, nontoxic-appearing & + +diaphoretic.   HENT:      Head: Normocephalic and atraumatic.      Mouth/Throat:      Mouth:  Mucous membranes are moist.      Pharynx: Oropharynx is clear.   Eyes:      Extraocular Movements: Extraocular movements intact.      Conjunctiva/sclera: Conjunctivae normal.   Cardiovascular:      Rate and Rhythm: Normal rate. Rhythm irregular.      Pulses: Normal pulses.      Heart sounds: Normal heart sounds. No murmur heard.     Comments: NO JVD  Pulmonary:      Effort: Pulmonary effort is normal. No respiratory distress. Breath sounds: have improved from yesterday  Abdominal:      General: Bowel sounds are normal. There is no distension.      Palpations: Abdomen is soft.      Tenderness: There is no abdominal tenderness. There is no guarding.   Musculoskeletal:         General: No swelling. Decreased range of motion. Can barely lift legs off the bed. Strength BLE 4/5.     Right lower leg: No edema.      Left lower leg: No edema.   Skin:     General: Skin is dry.      Findings: No erythema or rash.   Neurological:      Mental Status: He is alert and oriented to person, place, and time. Mental status is at baseline.      Motor: No weakness.   Psychiatric:         Mood and Affect: Mood normal.             Assessment/Plan      Zackary Shah is a 83 y.o. male with a pertinent medical history of aortic stenosis, atrial fibrillation and diastolic heart failure (EF 68% December 2023; diastolic function not evaluated on this echo, previous echo showed impairment of diastolic function with a EF of 55%) who presenting to [facility] ER with ongoing complaints of generalized weakness, worsening cough that was wet sounding with yellow mucus and increased dyspnea with diaphoresis is found to have acute hypoxic respiratory failure due to pneumonia    Acute hypoxic respiratory failure due to pneumonia  O2 demand remains stable, will continue to wean as tolerated  Chest x-ray revealed worsening infiltrates and small B/L pleural effusions  BNP within normal limits  Continue schedule Duonebs  Continue to wean O2 as toleralted    Continue  Rocephin (Day 4), ID advised to discontinue antibiotics at discharge  Continue albuterol as needed    Atrial fibrillation/subtherapeutic INR  Continue Coumadin  Monitor INR  Monitor on telemetry    Moderate to severe aortic stenosis/diastolic heart failure  Appears compensated  Net output of 1243  Continue Lasix and metoprolol  Will give lasix 40mg IV x1 and continue PO lasix    Type 2 diabetes mellitus  Last hemoglobin A1c was 6  Holding metformin  Continue insulin sliding scale with Accu-Cheks    Wound on buttocks  Wound care consulted    Constipation  Continue bowel regimen    GERD  PPI    DVT prophylaxis  SCDs and Coumadin    Disposition  Plan to continue treatment 1 more day, will continue to attempt to wean supplemental oxygen  -Able to wean oxygen by tomorrow and patient is stable will plan for discharge            Jesus Freeman DO

## 2024-01-05 NOTE — PROGRESS NOTES
Zackary Shah is a 83 y.o. male on day 4 of admission presenting with Hypoxia.    Subjective   Interval History: no fever, less sob and cough        Review of Systems    Objective   Range of Vitals (last 24 hours)  Heart Rate:  [84-95]   Temp:  [35.8 °C (96.4 °F)]   BP: (109-116)/(63-73)   Weight:  [102 kg (224 lb 6.9 oz)]   SpO2:  [93 %-95 %]   Daily Weight  01/05/24 : 102 kg (224 lb 6.9 oz)    Body mass index is 31.3 kg/m².    Physical Exam  Constitutional:       Appearance: Normal appearance.   HENT:      Head: Normocephalic and atraumatic.      Mouth/Throat:      Mouth: Mucous membranes are moist.      Pharynx: Oropharynx is clear.   Eyes:      Pupils: Pupils are equal, round, and reactive to light.   Cardiovascular:      Rate and Rhythm: Normal rate and regular rhythm.      Heart sounds: Normal heart sounds.   Pulmonary:      Effort: Pulmonary effort is normal.      Breath sounds: Normal breath sounds.   Abdominal:      General: Abdomen is flat. Bowel sounds are normal.      Palpations: Abdomen is soft.   Musculoskeletal:      Cervical back: Normal range of motion.   Neurological:      Mental Status: He is alert.         Antibiotics  ipratropium-albuteroL (Duo-Neb) 0.5-2.5 mg/3 mL nebulizer solution 3 mL  methylPREDNISolone sod succinate (SOLU-Medrol) injection 125 mg  magnesium sulfate IV 2 g  cefTRIAXone (Rocephin) IVPB 1 g  azithromycin (Zithromax) in dextrose 5 % in water (D5W) 250 mL  mg  enoxaparin (Lovenox) syringe 40 mg  acetaminophen (Tylenol) tablet 650 mg  ondansetron (Zofran) tablet 4 mg  ondansetron (Zofran) injection 4 mg  melatonin tablet 3 mg  docusate sodium (Colace) capsule 100 mg  azithromycin (Zithromax) in dextrose 5 % in water (D5W) 250 mL  mg  cefTRIAXone (Rocephin) 2 g IV in dextrose 5% 50 mL  albuterol 90 mcg/actuation inhaler 2 puff  furosemide (Lasix) tablet 40 mg  magnesium oxide (Mag-Ox) tablet 400 mg  metoprolol tartrate (Lopressor) tablet 25 mg  pantoprazole  "(ProtoNix) EC tablet 40 mg  psyllium (Metamucil) 3.4 gram packet 1 packet  warfarin (Coumadin) tablet 6 mg  dextrose 50 % injection 25 g  glucagon (Glucagen) injection 1 mg  dextrose 10 % in water (D10W) infusion  insulin lispro (HumaLOG) injection 0-5 Units  warfarin (Coumadin) tablet 6 mg  guaiFENesin (Mucinex) 12 hr tablet 1,200 mg  menthol-zinc oxide (Calmoseptine - Risamine) 0.44-20.6 % ointment 1 Application  cefTRIAXone (Rocephin) 2 g IV in dextrose 5% 50 mL  ipratropium-albuteroL (Duo-Neb) 0.5-2.5 mg/3 mL nebulizer solution 3 mL      Relevant Results  Labs  Results from last 72 hours   Lab Units 01/05/24  0723 01/04/24  0619   WBC AUTO x10*3/uL 9.8 8.7   HEMOGLOBIN g/dL 14.3 13.3*   HEMATOCRIT % 46.1 40.7*   PLATELETS AUTO x10*3/uL 328 316       Results from last 72 hours   Lab Units 01/05/24  0723 01/04/24  0619   SODIUM mmol/L 139 139   POTASSIUM mmol/L 4.0 3.5   CHLORIDE mmol/L 101 104   CO2 mmol/L 28 27   BUN mg/dL 17 19   CREATININE mg/dL 1.09 1.00   GLUCOSE mg/dL 99 100*   CALCIUM mg/dL 8.5* 8.1*   ANION GAP mmol/L 14 12   EGFR mL/min/1.73m*2 67 75   PHOSPHORUS mg/dL 3.2 3.4       Results from last 72 hours   Lab Units 01/05/24  0723 01/04/24  0619   ALBUMIN g/dL 3.3* 3.1*       Estimated Creatinine Clearance: 62.5 mL/min (by C-G formula based on SCr of 1.09 mg/dL).  No results found for: \"CRP\"  Microbiology  Reviewed  Imaging  Reviewed        Assessment/Plan     Likely postviral pneumonia, procalcitonin 0.2     Recommendations :  Continue Rocephin, plan to stop the antibiotics with discharge  Chest PT / incentive spirometry     I spent minutes in the professional and overall care of this patient.      Daniel Leal MD  "

## 2024-01-06 ENCOUNTER — HOME HEALTH ADMISSION (OUTPATIENT)
Dept: HOME HEALTH SERVICES | Facility: HOME HEALTH | Age: 84
End: 2024-01-06
Payer: MEDICARE

## 2024-01-06 VITALS
DIASTOLIC BLOOD PRESSURE: 81 MMHG | HEART RATE: 91 BPM | OXYGEN SATURATION: 91 % | TEMPERATURE: 97.3 F | WEIGHT: 220.68 LBS | HEIGHT: 71 IN | BODY MASS INDEX: 30.9 KG/M2 | RESPIRATION RATE: 18 BRPM | SYSTOLIC BLOOD PRESSURE: 136 MMHG

## 2024-01-06 PROBLEM — J15.9 PNEUMONIA DUE TO GRAM-POSITIVE BACTERIA: Status: RESOLVED | Noted: 2024-01-02 | Resolved: 2024-01-06

## 2024-01-06 PROBLEM — R09.02 HYPOXIA: Status: RESOLVED | Noted: 2024-01-01 | Resolved: 2024-01-06

## 2024-01-06 PROBLEM — B33.8 RSV (RESPIRATORY SYNCYTIAL VIRUS INFECTION): Status: RESOLVED | Noted: 2023-12-26 | Resolved: 2024-01-06

## 2024-01-06 LAB
BACTERIA BLD CULT: NORMAL
BACTERIA BLD CULT: NORMAL
GLUCOSE BLD MANUAL STRIP-MCNC: 113 MG/DL (ref 74–99)
GLUCOSE BLD MANUAL STRIP-MCNC: 124 MG/DL (ref 74–99)
GLUCOSE BLD MANUAL STRIP-MCNC: 98 MG/DL (ref 74–99)

## 2024-01-06 PROCEDURE — 82947 ASSAY GLUCOSE BLOOD QUANT: CPT

## 2024-01-06 PROCEDURE — 94640 AIRWAY INHALATION TREATMENT: CPT

## 2024-01-06 PROCEDURE — 2500000002 HC RX 250 W HCPCS SELF ADMINISTERED DRUGS (ALT 637 FOR MEDICARE OP, ALT 636 FOR OP/ED): Performed by: FAMILY MEDICINE

## 2024-01-06 PROCEDURE — 94668 MNPJ CHEST WALL SBSQ: CPT

## 2024-01-06 PROCEDURE — 2500000004 HC RX 250 GENERAL PHARMACY W/ HCPCS (ALT 636 FOR OP/ED): Performed by: NURSE PRACTITIONER

## 2024-01-06 PROCEDURE — 2500000001 HC RX 250 WO HCPCS SELF ADMINISTERED DRUGS (ALT 637 FOR MEDICARE OP): Performed by: NURSE PRACTITIONER

## 2024-01-06 PROCEDURE — 99238 HOSP IP/OBS DSCHRG MGMT 30/<: CPT | Performed by: FAMILY MEDICINE

## 2024-01-06 RX ORDER — ALBUTEROL SULFATE 90 UG/1
2 AEROSOL, METERED RESPIRATORY (INHALATION) EVERY 6 HOURS PRN
Qty: 18 G | Refills: 0 | Status: SHIPPED | OUTPATIENT
Start: 2024-01-06

## 2024-01-06 RX ADMIN — DOCUSATE SODIUM 100 MG: 100 CAPSULE, LIQUID FILLED ORAL at 08:45

## 2024-01-06 RX ADMIN — Medication 1 APPLICATION: at 08:50

## 2024-01-06 RX ADMIN — FUROSEMIDE 40 MG: 40 TABLET ORAL at 08:45

## 2024-01-06 RX ADMIN — METOPROLOL TARTRATE 25 MG: 25 TABLET, FILM COATED ORAL at 08:45

## 2024-01-06 RX ADMIN — IPRATROPIUM BROMIDE AND ALBUTEROL SULFATE 3 ML: 2.5; .5 SOLUTION RESPIRATORY (INHALATION) at 08:05

## 2024-01-06 RX ADMIN — PANTOPRAZOLE SODIUM 40 MG: 40 TABLET, DELAYED RELEASE ORAL at 06:05

## 2024-01-06 RX ADMIN — ACETAMINOPHEN 650 MG: 325 TABLET ORAL at 08:45

## 2024-01-06 RX ADMIN — Medication 400 MG: at 08:45

## 2024-01-06 RX ADMIN — GUAIFENESIN 1200 MG: 600 TABLET ORAL at 05:40

## 2024-01-06 ASSESSMENT — COGNITIVE AND FUNCTIONAL STATUS - GENERAL
TOILETING: A LITTLE
DRESSING REGULAR UPPER BODY CLOTHING: A LITTLE
WALKING IN HOSPITAL ROOM: A LITTLE
TURNING FROM BACK TO SIDE WHILE IN FLAT BAD: A LITTLE
STANDING UP FROM CHAIR USING ARMS: A LITTLE
MOVING TO AND FROM BED TO CHAIR: A LITTLE
DAILY ACTIVITIY SCORE: 20
DRESSING REGULAR LOWER BODY CLOTHING: A LITTLE
HELP NEEDED FOR BATHING: A LITTLE
MOVING FROM LYING ON BACK TO SITTING ON SIDE OF FLAT BED WITH BEDRAILS: A LITTLE
MOBILITY SCORE: 18
CLIMB 3 TO 5 STEPS WITH RAILING: A LITTLE

## 2024-01-06 ASSESSMENT — PAIN SCALES - GENERAL: PAINLEVEL_OUTOF10: 0 - NO PAIN

## 2024-01-06 NOTE — PROGRESS NOTES
Zackary Shah is a 83 y.o. male on day 5 of admission presenting with Hypoxia.    Subjective   Interval History: no fever, less sob and cough        Review of Systems    Objective   Range of Vitals (last 24 hours)  Heart Rate:  [79-98]   Temp:  [36.2 °C (97.2 °F)-36.3 °C (97.3 °F)]   Resp:  [16]   BP: ()/(69-79)   Weight:  [100 kg (220 lb 10.9 oz)]   SpO2:  [91 %-96 %]   Daily Weight  01/06/24 : 100 kg (220 lb 10.9 oz)    Body mass index is 30.78 kg/m².    Physical Exam  Constitutional:       Appearance: Normal appearance.   HENT:      Head: Normocephalic and atraumatic.      Mouth/Throat:      Mouth: Mucous membranes are moist.      Pharynx: Oropharynx is clear.   Eyes:      Pupils: Pupils are equal, round, and reactive to light.   Cardiovascular:      Rate and Rhythm: Normal rate and regular rhythm.      Heart sounds: Normal heart sounds.   Pulmonary:      Effort: Pulmonary effort is normal.      Breath sounds: Normal breath sounds.   Abdominal:      General: Abdomen is flat. Bowel sounds are normal.      Palpations: Abdomen is soft.   Musculoskeletal:      Cervical back: Normal range of motion.   Neurological:      Mental Status: He is alert.         Antibiotics  ipratropium-albuteroL (Duo-Neb) 0.5-2.5 mg/3 mL nebulizer solution 3 mL  methylPREDNISolone sod succinate (SOLU-Medrol) injection 125 mg  magnesium sulfate IV 2 g  cefTRIAXone (Rocephin) IVPB 1 g  azithromycin (Zithromax) in dextrose 5 % in water (D5W) 250 mL  mg  enoxaparin (Lovenox) syringe 40 mg  acetaminophen (Tylenol) tablet 650 mg  ondansetron (Zofran) tablet 4 mg  ondansetron (Zofran) injection 4 mg  melatonin tablet 3 mg  docusate sodium (Colace) capsule 100 mg  azithromycin (Zithromax) in dextrose 5 % in water (D5W) 250 mL  mg  cefTRIAXone (Rocephin) 2 g IV in dextrose 5% 50 mL  albuterol 90 mcg/actuation inhaler 2 puff  furosemide (Lasix) tablet 40 mg  magnesium oxide (Mag-Ox) tablet 400 mg  metoprolol tartrate (Lopressor)  "tablet 25 mg  pantoprazole (ProtoNix) EC tablet 40 mg  psyllium (Metamucil) 3.4 gram packet 1 packet  warfarin (Coumadin) tablet 6 mg  dextrose 50 % injection 25 g  glucagon (Glucagen) injection 1 mg  dextrose 10 % in water (D10W) infusion  insulin lispro (HumaLOG) injection 0-5 Units  warfarin (Coumadin) tablet 6 mg  guaiFENesin (Mucinex) 12 hr tablet 1,200 mg  menthol-zinc oxide (Calmoseptine - Risamine) 0.44-20.6 % ointment 1 Application  cefTRIAXone (Rocephin) 2 g IV in dextrose 5% 50 mL  ipratropium-albuteroL (Duo-Neb) 0.5-2.5 mg/3 mL nebulizer solution 3 mL      Relevant Results  Labs  Results from last 72 hours   Lab Units 01/05/24  0723 01/04/24  0619   WBC AUTO x10*3/uL 9.8 8.7   HEMOGLOBIN g/dL 14.3 13.3*   HEMATOCRIT % 46.1 40.7*   PLATELETS AUTO x10*3/uL 328 316       Results from last 72 hours   Lab Units 01/05/24  0723 01/04/24  0619   SODIUM mmol/L 139 139   POTASSIUM mmol/L 4.0 3.5   CHLORIDE mmol/L 101 104   CO2 mmol/L 28 27   BUN mg/dL 17 19   CREATININE mg/dL 1.09 1.00   GLUCOSE mg/dL 99 100*   CALCIUM mg/dL 8.5* 8.1*   ANION GAP mmol/L 14 12   EGFR mL/min/1.73m*2 67 75   PHOSPHORUS mg/dL 3.2 3.4       Results from last 72 hours   Lab Units 01/05/24  0723 01/04/24  0619   ALBUMIN g/dL 3.3* 3.1*       Estimated Creatinine Clearance: 61.9 mL/min (by C-G formula based on SCr of 1.09 mg/dL).  No results found for: \"CRP\"  Microbiology  Reviewed  Imaging  Reviewed        Assessment/Plan     Likely postviral pneumonia, procalcitonin 0.2     Recommendations :  Continue Rocephin, plan to stop the antibiotics with discharge  Chest PT / incentive spirometry   Wean the O2    I spent minutes in the professional and overall care of this patient.      Daniel Leal MD  "

## 2024-01-06 NOTE — DISCHARGE SUMMARY
Discharge Diagnosis  Acute proximal respiratory failure due to pneumonia, atrial fibrillation, supratherapeutic INR, moderate to severe aortic stenosis, diastolic heart failure, type 2 diabetes mellitus, wound on buttocks,, constipation, GERD    Discharge Meds     Your medication list        START taking these medications        Instructions Last Dose Given Next Dose Due   albuterol 90 mcg/actuation inhaler      Inhale 2 puffs every 6 hours if needed for wheezing.              CONTINUE taking these medications        Instructions Last Dose Given Next Dose Due   furosemide 40 mg tablet  Commonly known as: Lasix           magnesium oxide 400 mg tablet  Commonly known as: Mag-Ox           metFORMIN (OSM) 500 mg 24 hr tablet  Commonly known as: Fortamet           metoprolol tartrate 25 mg tablet  Commonly known as: Lopressor           omeprazole 40 mg DR capsule  Commonly known as: PriLOSEC           psyllium 3.4 gram packet  Commonly known as: Metamucil           warfarin 6 mg tablet  Commonly known as: Coumadin                     Where to Get Your Medications        These medications were sent to [id number] [place] [address] [address] [address] [id number]      Phone: [phone]   albuterol 90 mcg/actuation inhaler         Test Results Pending At Discharge  Pending Labs       No current pending labs.            Hospital Course   Zackary Shah is a 83 y.o. male with a pertinent medical history of aortic stenosis, atrial fibrillation and diastolic heart failure (EF 68% December 2023; diastolic function not evaluated on this echo, previous echo showed impairment of diastolic function with a EF of 55%) who presenting to [facility] ER with ongoing complaints of generalized weakness, worsening cough that was wet sounding with yellow mucus and increased dyspnea with diaphoresis is found to have acute hypoxic respiratory failure due to pneumonia   Patient was followed by infectious  disease.  During his hospitalization he was treated with Rocephin and DuoNebs.  On day of discharge a home-going O2 eval revealed that the patient did not require further oxygen supplementation.    Pertinent Physical Exam At Time of Discharge  Physical Exam  Gen: lying comfortably in bed, not in acute distress  HEENT: atraumatic, normocephalic  Pulm: normal respiratory effort, clear to auscultation b/l  Cardiac: RRR, no murmurs noted, normal S1/S2  GI: Soft, nontender, BS+  MSK: normal ROM without joint swelling  Extremities: no LE edema, cyanosis  Neuro: AOX3, CN II-XII grossly intact, equal b/l strength, no loss in sensation   Psych: calm and appropriate for situation   Outpatient Follow-Up  Future Appointments   Date Time Provider Department Center   2/14/2024 10:40 AM Tommie Patrick MD [facility]         Jesus Freeman DO

## 2024-01-07 LAB
ATRIAL RATE: 267 BPM
Q ONSET: 224 MS
QRS COUNT: 16 BEATS
QRS DURATION: 84 MS
QT INTERVAL: 342 MS
QTC CALCULATION(BAZETT): 438 MS
QTC FREDERICIA: 404 MS
R AXIS: 34 DEGREES
T AXIS: 44 DEGREES
T OFFSET: 395 MS
VENTRICULAR RATE: 99 BPM

## 2024-01-09 NOTE — SIGNIFICANT EVENT
Follow Up Phone Call    Outgoing phone call    Spoke to: Zackary Shah Relationship:self   Phone number: [phone]      Outcome: contacted patient/ family   Chief Complaint   Patient presents with   • Shortness of Breath     Pt presents to the ER with shortness of breath that began x2 days ago. Pt has a wet cough and has dark colored phlegm.            Diagnosis:PNEUMONIA Follow up Call:    How is your breathing? better   How is your activity? same/at baseline   How is your cough?  usual amount   Mucus: mucus: usual amount and color   How often Are you using a Quick Relief/ Rescue Inhaler / Nebulizer:   more than baseline             States he is feeling better. No further questions or concerns.

## 2024-01-10 ENCOUNTER — HOME CARE VISIT (OUTPATIENT)
Dept: HOME HEALTH SERVICES | Facility: HOME HEALTH | Age: 84
End: 2024-01-10
Payer: MEDICARE

## 2024-01-10 VITALS
BODY MASS INDEX: 30.94 KG/M2 | WEIGHT: 221 LBS | HEART RATE: 68 BPM | TEMPERATURE: 97.8 F | HEIGHT: 71 IN | RESPIRATION RATE: 20 BRPM | DIASTOLIC BLOOD PRESSURE: 62 MMHG | OXYGEN SATURATION: 97 % | SYSTOLIC BLOOD PRESSURE: 118 MMHG

## 2024-01-10 PROCEDURE — 1090000002 HH PPS REVENUE DEBIT

## 2024-01-10 PROCEDURE — G0299 HHS/HOSPICE OF RN EA 15 MIN: HCPCS | Mod: HHH

## 2024-01-10 PROCEDURE — 169592 NO-PAY CLAIM PROCEDURE

## 2024-01-10 PROCEDURE — 1090000001 HH PPS REVENUE CREDIT

## 2024-01-10 PROCEDURE — 0023 HH SOC

## 2024-01-10 ASSESSMENT — ENCOUNTER SYMPTOMS
SHORTNESS OF BREATH: 1
PAIN LOCATION: BACK
PAIN LOCATION - RELIEVING FACTORS: REST, TYLENOL
PAIN LOCATION - PAIN FREQUENCY: INFREQUENT
DYSPNEA ACTIVITY LEVEL: AFTER AMBULATING 10 - 20 FT
FATIGUES EASILY: 1
CHANGE IN APPETITE: UNCHANGED
PAIN SEVERITY GOAL: 0/10
MUSCLE WEAKNESS: 1
COUGH: 1
COUGH CHARACTERISTICS: NON-PRODUCTIVE
PAIN: 1
LOWEST PAIN SEVERITY IN PAST 24 HOURS: 0/10
OCCASIONAL FEELINGS OF UNSTEADINESS: 0
PAIN LOCATION - PAIN SEVERITY: 1/10
LOSS OF SENSATION IN FEET: 0
HIGHEST PAIN SEVERITY IN PAST 24 HOURS: 0/10
APPETITE LEVEL: GOOD
PERSON REPORTING PAIN: PATIENT
DEPRESSION: 0

## 2024-01-10 ASSESSMENT — ACTIVITIES OF DAILY LIVING (ADL)
AMBULATION ASSISTANCE: 1
PHYSICAL TRANSFERS ASSESSED: 1
OASIS_M1830: 03
CURRENT_FUNCTION: ONE PERSON
AMBULATION ASSISTANCE: ONE PERSON
ENTERING_EXITING_HOME: ONE PERSON

## 2024-01-11 PROCEDURE — 1090000002 HH PPS REVENUE DEBIT

## 2024-01-11 PROCEDURE — 1090000001 HH PPS REVENUE CREDIT

## 2024-01-11 NOTE — HOME HEALTH
Patient is identified by name and .    He is reluctant /hesitant to have homecare, stating that he didnt feel any needs for homecare.    Although he did agree to start of care and follow up visits.    He has had multiple emergency department visits and hospitalizations that are recent, and I encouraged patient to allow homecare, to avoid/prevent rehospitalization, but also to have someone checking on patient, and educating regarding new diagnosis of heart failure, teaching with respect to disease and symptom management, medication management, safety.

## 2024-01-12 PROCEDURE — 1090000002 HH PPS REVENUE DEBIT

## 2024-01-12 PROCEDURE — 1090000001 HH PPS REVENUE CREDIT

## 2024-01-13 PROCEDURE — 1090000002 HH PPS REVENUE DEBIT

## 2024-01-13 PROCEDURE — 1090000001 HH PPS REVENUE CREDIT

## 2024-01-14 PROCEDURE — 1090000001 HH PPS REVENUE CREDIT

## 2024-01-14 PROCEDURE — 1090000002 HH PPS REVENUE DEBIT

## 2024-01-15 PROCEDURE — 1090000001 HH PPS REVENUE CREDIT

## 2024-01-15 PROCEDURE — 1090000002 HH PPS REVENUE DEBIT

## 2024-01-16 PROCEDURE — 1090000002 HH PPS REVENUE DEBIT

## 2024-01-16 PROCEDURE — 1090000001 HH PPS REVENUE CREDIT

## 2024-01-17 PROCEDURE — 1090000002 HH PPS REVENUE DEBIT

## 2024-01-17 PROCEDURE — 1090000001 HH PPS REVENUE CREDIT

## 2024-01-18 ENCOUNTER — HOME CARE VISIT (OUTPATIENT)
Dept: HOME HEALTH SERVICES | Facility: HOME HEALTH | Age: 84
End: 2024-01-18
Payer: MEDICARE

## 2024-01-18 PROCEDURE — G0299 HHS/HOSPICE OF RN EA 15 MIN: HCPCS | Mod: HHH

## 2024-01-18 PROCEDURE — 1090000002 HH PPS REVENUE DEBIT

## 2024-01-18 PROCEDURE — 1090000003 HH PPS REVENUE ADJ

## 2024-01-18 PROCEDURE — 1090000001 HH PPS REVENUE CREDIT

## 2024-01-19 VITALS
RESPIRATION RATE: 20 BRPM | SYSTOLIC BLOOD PRESSURE: 124 MMHG | DIASTOLIC BLOOD PRESSURE: 68 MMHG | HEART RATE: 72 BPM | OXYGEN SATURATION: 98 % | TEMPERATURE: 97.4 F

## 2024-01-19 ASSESSMENT — ENCOUNTER SYMPTOMS
DYSPNEA ACTIVITY LEVEL: AFTER AMBULATING 10 - 20 FT
CHANGE IN APPETITE: UNCHANGED
LIMITED RANGE OF MOTION: 1
SHORTNESS OF BREATH: 1
APPETITE LEVEL: GOOD

## 2024-01-19 ASSESSMENT — ACTIVITIES OF DAILY LIVING (ADL)
AMBULATION ASSISTANCE: SUPERVISION
OASIS_M1830: 01
CURRENT_FUNCTION: STAND BY ASSIST
ADLS_COMMENTS: USES CANE
AMBULATION ASSISTANCE: 1
HOME_HEALTH_OASIS: 01

## 2024-01-19 NOTE — CASE COMMUNICATION
Patient refuses further homecare.     Encouraged to allow for education and to assist with prevention of readmission.    He requires additional teaching regarding disease teaching/heart failure - as he states that he does not believe that he has that diagnosis.    Encouraged to make appointment with his doctors for follow up.    Encouraged to allow homecare to continue, but he refuses/declines.

## 2024-02-14 ENCOUNTER — OFFICE VISIT (OUTPATIENT)
Dept: CARDIOLOGY | Facility: HOSPITAL | Age: 84
End: 2024-02-14
Payer: MEDICARE

## 2024-02-14 VITALS
OXYGEN SATURATION: 99 % | WEIGHT: 229.5 LBS | SYSTOLIC BLOOD PRESSURE: 122 MMHG | DIASTOLIC BLOOD PRESSURE: 71 MMHG | BODY MASS INDEX: 32.01 KG/M2 | HEART RATE: 56 BPM

## 2024-02-14 DIAGNOSIS — I48.11 LONGSTANDING PERSISTENT ATRIAL FIBRILLATION (MULTI): ICD-10-CM

## 2024-02-14 DIAGNOSIS — I35.0 NONRHEUMATIC AORTIC VALVE STENOSIS: ICD-10-CM

## 2024-02-14 DIAGNOSIS — I50.9 DECOMPENSATED HEART FAILURE (MULTI): Primary | ICD-10-CM

## 2024-02-14 PROCEDURE — 93005 ELECTROCARDIOGRAM TRACING: CPT | Performed by: INTERNAL MEDICINE

## 2024-02-14 PROCEDURE — 99214 OFFICE O/P EST MOD 30 MIN: CPT | Performed by: INTERNAL MEDICINE

## 2024-02-14 PROCEDURE — 1036F TOBACCO NON-USER: CPT | Performed by: INTERNAL MEDICINE

## 2024-02-14 PROCEDURE — 1126F AMNT PAIN NOTED NONE PRSNT: CPT | Performed by: INTERNAL MEDICINE

## 2024-02-14 PROCEDURE — 93010 ELECTROCARDIOGRAM REPORT: CPT | Performed by: INTERNAL MEDICINE

## 2024-02-14 PROCEDURE — 1159F MED LIST DOCD IN RCRD: CPT | Performed by: INTERNAL MEDICINE

## 2024-02-14 RX ORDER — METOPROLOL SUCCINATE 25 MG/1
25 TABLET, EXTENDED RELEASE ORAL DAILY
Qty: 90 TABLET | Refills: 3 | Status: SHIPPED | OUTPATIENT
Start: 2024-02-14 | End: 2025-02-13

## 2024-02-14 ASSESSMENT — ENCOUNTER SYMPTOMS: DEPRESSION: 0

## 2024-02-14 NOTE — PROGRESS NOTES
History Of Present Illness:    Zackary Shah is a 83 y.o. male with a history of aFib on coumadin, moderate aortic stenosis in 2020 who was recently admitted for acute hypoxic respiratory failure d/t RSV. He is here today to reestablish cardiac care and for monitoring of his aortic stenosis.      He states feeling well since being discharged from the hospital. Denies chest pain, shortness of breath, heart palpations, dizziness or orthopnea. He tries to stay active, goes out grocery shopping and walks around his home.       Past Medical History:  He has a past medical history of Aortic stenosis, Atrial fibrillation (CMS/HCC), CHF (congestive heart failure) (CMS/HCC), Diabetes mellitus (CMS/HCC), and GERD (gastroesophageal reflux disease).    Past Surgical History:  He has a past surgical history that includes Other surgical history (10/24/2019); Cholecystectomy; and Appendectomy.      Social History:  He reports that he has quit smoking. His smoking use included cigarettes. He has never used smokeless tobacco. He reports that he does not currently use alcohol. He reports that he does not currently use drugs.    Family History:  Family History   Problem Relation Name Age of Onset    Other (pacemaker) Son          Allergies:  Acetaminophen and Codeine    Outpatient Medications:  Current Outpatient Medications   Medication Instructions    acetaminophen (TYLENOL) 650 mg, oral, Every 6 hours PRN    albuterol 90 mcg/actuation inhaler 2 puffs, inhalation, Every 6 hours PRN    furosemide (LASIX) 40 mg, oral, Daily    magnesium oxide (MAG-OX) 400 mg, Daily    metFORMIN (OSM) (FORTAMET) 500 mg, oral, Do not crush, chew, or split.    metoprolol succinate XL (TOPROL-XL) 25 mg, oral, Daily, Do not crush or chew.    metoprolol tartrate (LOPRESSOR) 25 mg, oral, Daily    omeprazole (PRILOSEC) 20 mg, oral, Daily before breakfast, Do not crush or chew.    psyllium (Metamucil) 3.4 gram packet 1 packet, oral, Daily    warfarin  "(Coumadin) 6 mg tablet 1 tablet, oral, Every other day, Take as directed per After Visit Summary.<BR>According to patient<BR><BR>Patient to follow up with PCP regarding PT/INR and any medication changes.        Last Recorded Vitals:  Vitals:    02/14/24 1042   BP: 122/71   Pulse: 56   SpO2: 99%   Weight: 104 kg (229 lb 8 oz)       Physical Exam  Vitals reviewed.   HENT:      Head: Normocephalic.      Nose: Nose normal.   Eyes:      Pupils: Pupils are equal, round, and reactive to light.   Cardiovascular:      Rate and Rhythm: Normal rate and regular rhythm.   Pulmonary:      Effort: Pulmonary effort is normal.      Breath sounds: Normal breath sounds.   Abdominal:      General: Abdomen is flat.      Palpations: Abdomen is soft.   Musculoskeletal:         General: Normal range of motion.      Cervical back: Normal range of motion.   Skin:     General: Skin is warm and dry.   Neurological:      General: No focal deficit present.      Mental Status: He is alert and oriented to person, place, and time.   Psychiatric:         Mood and Affect: Mood normal.         Behavior: Behavior normal.       Last Labs:  CBC -  Lab Results   Component Value Date    WBC 9.8 01/05/2024    HGB 14.3 01/05/2024    HCT 46.1 01/05/2024    MCV 99 01/05/2024     01/05/2024       CMP -  Lab Results   Component Value Date    CALCIUM 8.5 (L) 01/05/2024    PHOS 3.2 01/05/2024    PROT 7.5 01/01/2024    ALBUMIN 3.3 (L) 01/05/2024    AST 22 01/01/2024    ALT 28 01/01/2024    ALKPHOS 91 01/01/2024    BILITOT 1.7 (H) 01/01/2024       LIPID PANEL -   No results found for: \"CHOL\", \"TRIG\", \"HDL\", \"CHHDL\", \"LDLF\", \"VLDL\", \"NHDL\"    RENAL FUNCTION PANEL -   Lab Results   Component Value Date    GLUCOSE 99 01/05/2024     01/05/2024    K 4.0 01/05/2024     01/05/2024    CO2 28 01/05/2024    ANIONGAP 14 01/05/2024    BUN 17 01/05/2024    CREATININE 1.09 01/05/2024    GFRMALE 76 02/12/2022    CALCIUM 8.5 (L) 01/05/2024    PHOS 3.2 01/05/2024 "    ALBUMIN 3.3 (L) 01/05/2024        Lab Results   Component Value Date     (H) 01/03/2024    HGBA1C 6.0 08/29/2019       Last Cardiology Tests:  ECG:  ECG 12 lead 01/01/2024  aFib 99bpm    Echo:10/30/20   1. The left ventricular systolic function is normal with a 55% estimated ejection fraction.   2. Spectral Doppler shows an abnormal pattern of left ventricular diastolic filling.   3. Moderate aortic valve stenosis.   4. There is mild mitral and tricuspid regurgitation.   5. The estimated pulmonary artery pressure is mildly elevated with the RVSP at 39.4 mmHg.    Cath:  No results found for this or any previous visit from the past 1095 days.      Stress Test:  No results found for this or any previous visit from the past 1095 days.      Cardiac Imaging:  No results found for this or any previous visit from the past 1095 days.      Lab/Diag/Rad Reviewed  Previous provider notes reviewed       Assessment/Plan   Zackary Shah is a 83 y.o. male with a history of aFib on coumadin, moderate aortic stenosis in 2020 who was recently admitted for acute hypoxic respiratory failure d/t RSV.     He is doing well from a cardiovascular stand point. Blood pressure and heart rate are well controlled. He is taking his metoprolol tartrate once daily.     Plan:  -Echocardiogram to evaluate for structural and functional abnormalities and aortic stenosis surveillance  -Change Metoprolol Tartrate to Succinate, 25mg once daily  -Continue Lasix and Coumadin   -Continue to follow with the [facility]       KARL Beth-CNP

## 2024-02-20 LAB
ATRIAL RATE: 40 BPM
Q ONSET: 223 MS
QRS COUNT: 10 BEATS
QRS DURATION: 74 MS
QT INTERVAL: 468 MS
QTC CALCULATION(BAZETT): 468 MS
QTC FREDERICIA: 468 MS
R AXIS: 14 DEGREES
T AXIS: 42 DEGREES
T OFFSET: 457 MS
VENTRICULAR RATE: 60 BPM

## 2024-03-04 ENCOUNTER — APPOINTMENT (OUTPATIENT)
Dept: RADIOLOGY | Facility: HOSPITAL | Age: 84
End: 2024-03-04

## 2024-03-12 ENCOUNTER — HOSPITAL ENCOUNTER (OUTPATIENT)
Dept: RADIOLOGY | Facility: HOSPITAL | Age: 84
Discharge: HOME | End: 2024-03-12
Payer: MEDICARE

## 2024-03-12 DIAGNOSIS — R91.1 SOLITARY PULMONARY NODULE: ICD-10-CM

## 2024-03-12 DIAGNOSIS — I77.810 THORACIC AORTIC ECTASIA (CMS-HCC): ICD-10-CM

## 2024-03-12 PROCEDURE — 71250 CT THORAX DX C-: CPT | Performed by: RADIOLOGY

## 2024-03-12 PROCEDURE — 71250 CT THORAX DX C-: CPT

## 2024-04-30 ENCOUNTER — HOSPITAL ENCOUNTER (EMERGENCY)
Facility: HOSPITAL | Age: 84
Discharge: HOME | End: 2024-04-30
Attending: EMERGENCY MEDICINE
Payer: MEDICARE

## 2024-04-30 ENCOUNTER — APPOINTMENT (OUTPATIENT)
Dept: RADIOLOGY | Facility: HOSPITAL | Age: 84
End: 2024-04-30
Payer: MEDICARE

## 2024-04-30 ENCOUNTER — APPOINTMENT (OUTPATIENT)
Dept: CARDIOLOGY | Facility: HOSPITAL | Age: 84
End: 2024-04-30
Payer: MEDICARE

## 2024-04-30 VITALS
OXYGEN SATURATION: 94 % | BODY MASS INDEX: 31.48 KG/M2 | DIASTOLIC BLOOD PRESSURE: 82 MMHG | HEIGHT: 71 IN | WEIGHT: 224.87 LBS | RESPIRATION RATE: 15 BRPM | HEART RATE: 67 BPM | TEMPERATURE: 97.9 F | SYSTOLIC BLOOD PRESSURE: 127 MMHG

## 2024-04-30 DIAGNOSIS — S00.81XA ABRASION OF FACE, INITIAL ENCOUNTER: ICD-10-CM

## 2024-04-30 DIAGNOSIS — D68.9 COAGULOPATHY (MULTI): ICD-10-CM

## 2024-04-30 DIAGNOSIS — S02.2XXA CLOSED FRACTURE OF NASAL BONE, INITIAL ENCOUNTER: ICD-10-CM
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
## 2024-04-30 LAB
ABO GROUP (TYPE) IN BLOOD: NORMAL
ALBUMIN SERPL BCP-MCNC: 3.8 G/DL (ref 3.4–5)
ALP SERPL-CCNC: 96 U/L (ref 33–136)
ALT SERPL W P-5'-P-CCNC: 11 U/L (ref 10–52)
ANION GAP BLDV CALCULATED.4IONS-SCNC: 10 MMOL/L (ref 10–25)
ANION GAP SERPL CALC-SCNC: 14 MMOL/L (ref 10–20)
ANTIBODY SCREEN: NORMAL
APTT PPP: 38 SECONDS (ref 27–38)
AST SERPL W P-5'-P-CCNC: 16 U/L (ref 9–39)
BASE EXCESS BLDV CALC-SCNC: 1.5 MMOL/L (ref -2–3)
BASOPHILS # BLD AUTO: 0.03 X10*3/UL (ref 0–0.1)
BASOPHILS NFR BLD AUTO: 0.4 %
BILIRUB SERPL-MCNC: 0.8 MG/DL (ref 0–1.2)
BODY TEMPERATURE: ABNORMAL
BUN SERPL-MCNC: 17 MG/DL (ref 6–23)
CA-I BLDV-SCNC: 1.1 MMOL/L (ref 1.1–1.33)
CALCIUM SERPL-MCNC: 8.6 MG/DL (ref 8.6–10.3)
CARDIAC TROPONIN I PNL SERPL HS: 6 NG/L (ref 0–20)
CHLORIDE BLDV-SCNC: 102 MMOL/L (ref 98–107)
CHLORIDE SERPL-SCNC: 102 MMOL/L (ref 98–107)
CO2 SERPL-SCNC: 26 MMOL/L (ref 21–32)
CREAT SERPL-MCNC: 1.19 MG/DL (ref 0.5–1.3)
EGFRCR SERPLBLD CKD-EPI 2021: 61 ML/MIN/1.73M*2
EOSINOPHIL # BLD AUTO: 0.16 X10*3/UL (ref 0–0.4)
EOSINOPHIL NFR BLD AUTO: 2 %
ERYTHROCYTE [DISTWIDTH] IN BLOOD BY AUTOMATED COUNT: 14.2 % (ref 11.5–14.5)
GLUCOSE BLDV-MCNC: 106 MG/DL (ref 74–99)
GLUCOSE SERPL-MCNC: 108 MG/DL (ref 74–99)
HCO3 BLDV-SCNC: 27.2 MMOL/L (ref 22–26)
HCT VFR BLD AUTO: 43.7 % (ref 41–52)
HCT VFR BLD EST: 44 % (ref 41–52)
HGB BLD-MCNC: 14.3 G/DL (ref 13.5–17.5)
HGB BLDV-MCNC: 14.5 G/DL (ref 13.5–17.5)
IMM GRANULOCYTES # BLD AUTO: 0.03 X10*3/UL (ref 0–0.5)
IMM GRANULOCYTES NFR BLD AUTO: 0.4 % (ref 0–0.9)
INHALED O2 CONCENTRATION: 100 %
INR PPP: 2.3 (ref 0.9–1.1)
LACTATE BLDV-SCNC: 2 MMOL/L (ref 0.4–2)
LYMPHOCYTES # BLD AUTO: 1.52 X10*3/UL (ref 0.8–3)
LYMPHOCYTES NFR BLD AUTO: 19 %
MAGNESIUM SERPL-MCNC: 2.2 MG/DL (ref 1.6–2.4)
MCH RBC QN AUTO: 30.3 PG (ref 26–34)
MCHC RBC AUTO-ENTMCNC: 32.7 G/DL (ref 32–36)
MCV RBC AUTO: 93 FL (ref 80–100)
MONOCYTES # BLD AUTO: 0.77 X10*3/UL (ref 0.05–0.8)
MONOCYTES NFR BLD AUTO: 9.6 %
NEUTROPHILS # BLD AUTO: 5.49 X10*3/UL (ref 1.6–5.5)
NEUTROPHILS NFR BLD AUTO: 68.6 %
NRBC BLD-RTO: 0 /100 WBCS (ref 0–0)
OXYHGB MFR BLDV: 57 % (ref 45–75)
PCO2 BLDV: 46 MM HG (ref 41–51)
PH BLDV: 7.38 PH (ref 7.33–7.43)
PLATELET # BLD AUTO: 222 X10*3/UL (ref 150–450)
PO2 BLDV: 37 MM HG (ref 35–45)
POTASSIUM BLDV-SCNC: 4 MMOL/L (ref 3.5–5.3)
POTASSIUM SERPL-SCNC: 3.9 MMOL/L (ref 3.5–5.3)
PROT SERPL-MCNC: 7 G/DL (ref 6.4–8.2)
PROTHROMBIN TIME: 26 SECONDS (ref 9.8–12.8)
RBC # BLD AUTO: 4.72 X10*6/UL (ref 4.5–5.9)
RH FACTOR (ANTIGEN D): NORMAL
SAO2 % BLDV: 58 % (ref 45–75)
SODIUM BLDV-SCNC: 135 MMOL/L (ref 136–145)
SODIUM SERPL-SCNC: 138 MMOL/L (ref 136–145)
WBC # BLD AUTO: 8 X10*3/UL (ref 4.4–11.3)

## 2024-04-30 PROCEDURE — 90471 IMMUNIZATION ADMIN: CPT | Performed by: STUDENT IN AN ORGANIZED HEALTH CARE EDUCATION/TRAINING PROGRAM

## 2024-04-30 PROCEDURE — 2500000004 HC RX 250 GENERAL PHARMACY W/ HCPCS (ALT 636 FOR OP/ED): Performed by: STUDENT IN AN ORGANIZED HEALTH CARE EDUCATION/TRAINING PROGRAM

## 2024-04-30 PROCEDURE — 99285 EMERGENCY DEPT VISIT HI MDM: CPT | Mod: 25

## 2024-04-30 PROCEDURE — 84132 ASSAY OF SERUM POTASSIUM: CPT | Mod: 59,91 | Performed by: STUDENT IN AN ORGANIZED HEALTH CARE EDUCATION/TRAINING PROGRAM

## 2024-04-30 PROCEDURE — 76377 3D RENDER W/INTRP POSTPROCES: CPT | Performed by: RADIOLOGY

## 2024-04-30 PROCEDURE — 70450 CT HEAD/BRAIN W/O DYE: CPT | Performed by: RADIOLOGY

## 2024-04-30 PROCEDURE — 70486 CT MAXILLOFACIAL W/O DYE: CPT | Performed by: RADIOLOGY

## 2024-04-30 PROCEDURE — 93005 ELECTROCARDIOGRAM TRACING: CPT

## 2024-04-30 PROCEDURE — 36415 COLL VENOUS BLD VENIPUNCTURE: CPT | Performed by: STUDENT IN AN ORGANIZED HEALTH CARE EDUCATION/TRAINING PROGRAM

## 2024-04-30 PROCEDURE — 84132 ASSAY OF SERUM POTASSIUM: CPT | Mod: 91 | Performed by: STUDENT IN AN ORGANIZED HEALTH CARE EDUCATION/TRAINING PROGRAM

## 2024-04-30 PROCEDURE — 73552 X-RAY EXAM OF FEMUR 2/>: CPT | Mod: RIGHT SIDE | Performed by: RADIOLOGY

## 2024-04-30 PROCEDURE — 83735 ASSAY OF MAGNESIUM: CPT | Performed by: STUDENT IN AN ORGANIZED HEALTH CARE EDUCATION/TRAINING PROGRAM

## 2024-04-30 PROCEDURE — 86900 BLOOD TYPING SEROLOGIC ABO: CPT | Mod: 91 | Performed by: STUDENT IN AN ORGANIZED HEALTH CARE EDUCATION/TRAINING PROGRAM

## 2024-04-30 PROCEDURE — 73564 X-RAY EXAM KNEE 4 OR MORE: CPT | Mod: RT

## 2024-04-30 PROCEDURE — 90715 TDAP VACCINE 7 YRS/> IM: CPT | Performed by: STUDENT IN AN ORGANIZED HEALTH CARE EDUCATION/TRAINING PROGRAM

## 2024-04-30 PROCEDURE — 76377 3D RENDER W/INTRP POSTPROCES: CPT

## 2024-04-30 PROCEDURE — 73590 X-RAY EXAM OF LOWER LEG: CPT | Mod: RIGHT SIDE | Performed by: RADIOLOGY

## 2024-04-30 PROCEDURE — 84484 ASSAY OF TROPONIN QUANT: CPT | Performed by: STUDENT IN AN ORGANIZED HEALTH CARE EDUCATION/TRAINING PROGRAM

## 2024-04-30 PROCEDURE — 70450 CT HEAD/BRAIN W/O DYE: CPT

## 2024-04-30 PROCEDURE — 72125 CT NECK SPINE W/O DYE: CPT | Performed by: RADIOLOGY

## 2024-04-30 PROCEDURE — 85610 PROTHROMBIN TIME: CPT | Performed by: STUDENT IN AN ORGANIZED HEALTH CARE EDUCATION/TRAINING PROGRAM

## 2024-04-30 PROCEDURE — 70486 CT MAXILLOFACIAL W/O DYE: CPT

## 2024-04-30 PROCEDURE — 85730 THROMBOPLASTIN TIME PARTIAL: CPT | Performed by: STUDENT IN AN ORGANIZED HEALTH CARE EDUCATION/TRAINING PROGRAM

## 2024-04-30 PROCEDURE — 85025 COMPLETE CBC W/AUTO DIFF WBC: CPT | Performed by: STUDENT IN AN ORGANIZED HEALTH CARE EDUCATION/TRAINING PROGRAM

## 2024-04-30 PROCEDURE — 73564 X-RAY EXAM KNEE 4 OR MORE: CPT | Mod: RIGHT SIDE | Performed by: RADIOLOGY

## 2024-04-30 PROCEDURE — 96374 THER/PROPH/DIAG INJ IV PUSH: CPT

## 2024-04-30 PROCEDURE — 72125 CT NECK SPINE W/O DYE: CPT

## 2024-04-30 PROCEDURE — 2500000001 HC RX 250 WO HCPCS SELF ADMINISTERED DRUGS (ALT 637 FOR MEDICARE OP): Performed by: STUDENT IN AN ORGANIZED HEALTH CARE EDUCATION/TRAINING PROGRAM

## 2024-04-30 PROCEDURE — 73590 X-RAY EXAM OF LOWER LEG: CPT | Mod: RT

## 2024-04-30 PROCEDURE — 73552 X-RAY EXAM OF FEMUR 2/>: CPT | Mod: RT

## 2024-04-30 RX ORDER — BACITRACIN ZINC 500 UNIT/G
1 OINTMENT IN PACKET (EA) TOPICAL ONCE
Status: COMPLETED | OUTPATIENT
Start: 2024-04-30 | End: 2024-04-30

## 2024-04-30 RX ORDER — BACITRACIN ZINC 500 UNIT/G
1 OINTMENT (GRAM) TOPICAL 2 TIMES DAILY
Qty: 14 G | Refills: 0 | Status: SHIPPED | OUTPATIENT
Start: 2024-04-30 | End: 2024-05-10

## 2024-04-30 RX ORDER — KETOROLAC TROMETHAMINE 15 MG/ML
15 INJECTION, SOLUTION INTRAMUSCULAR; INTRAVENOUS ONCE
Status: COMPLETED | OUTPATIENT
Start: 2024-04-30 | End: 2024-04-30

## 2024-04-30 RX ADMIN — KETOROLAC TROMETHAMINE 15 MG: 15 INJECTION, SOLUTION INTRAMUSCULAR; INTRAVENOUS at 14:35

## 2024-04-30 RX ADMIN — BACITRACIN 1 APPLICATION: 500 OINTMENT TOPICAL at 14:51

## 2024-04-30 RX ADMIN — TETANUS TOXOID, REDUCED DIPHTHERIA TOXOID AND ACELLULAR PERTUSSIS VACCINE, ADSORBED 0.5 ML: 5; 2.5; 8; 8; 2.5 SUSPENSION INTRAMUSCULAR at 14:53

## 2024-04-30 ASSESSMENT — PAIN - FUNCTIONAL ASSESSMENT
PAIN_FUNCTIONAL_ASSESSMENT: 0-10
PAIN_FUNCTIONAL_ASSESSMENT: 0-10

## 2024-04-30 ASSESSMENT — LIFESTYLE VARIABLES
TOTAL SCORE: 0
HAVE YOU EVER FELT YOU SHOULD CUT DOWN ON YOUR DRINKING: NO
EVER HAD A DRINK FIRST THING IN THE MORNING TO STEADY YOUR NERVES TO GET RID OF A HANGOVER: NO
HAVE PEOPLE ANNOYED YOU BY CRITICIZING YOUR DRINKING: NO
EVER FELT BAD OR GUILTY ABOUT YOUR DRINKING: NO

## 2024-04-30 ASSESSMENT — PAIN DESCRIPTION - LOCATION: LOCATION: FACE

## 2024-04-30 ASSESSMENT — PAIN SCALES - GENERAL
PAINLEVEL_OUTOF10: 8
PAINLEVEL_OUTOF10: 8
PAINLEVEL_OUTOF10: 7

## 2024-04-30 NOTE — ED TRIAGE NOTES
Pt fell in his hallway and hit his face on the floor. No LOC. Pt has abrasions to forehead and nose. Pt also c/o bilat knee pain.

## 2024-04-30 NOTE — PROGRESS NOTES
The patient was seen by the midlevel/resident.  I have personally saw the patient and made/approved the management plan and take responsibility for the patient management.  I reviewed the EKG's (when done) and agree with the interpretation.  I have seen and examined the patient; agree with the workup, evaluation, MDM, and diagnosis.  The care plan has been discussed with the midlevel/resident; I have reviewed the note and agree with the documented findings.     Patient presents after a fall at home.  He was carrying a broom and went down.  He he thinks he tripped on it.  He remembers the entire event did not lose consciousness.  He has abrasion on his face i.e. forehead and nose.  Does take Coumadin was worked up as an HIA.  CT scans x-rays were requested.  Was able to ambulate afterwards.  Patient presents with his daughter-in-law at the bedside.  Awaiting results. Nasal fracture found. Sable for discharge.   Diagnoses as of 04/30/24 1854   Fall, initial encounter   Coagulopathy (Multi)   Abrasion of face, initial encounter   Closed fracture of nasal bone, initial encounter     Mike Velez MD

## 2024-04-30 NOTE — ED PROVIDER NOTES
HPI   Chief Complaint   Patient presents with    Fall     Pt fell in his hallway and hit his face on the floor. No LOC. Pt has abrasions to forehead and nose. Pt also c/o bilat knee pain.       83-year-old male with atrial fibrillation on warfarin, aortic stenosis, CHF here after a fall.   states he was walking with a broom, denies prodromal symptoms, states he believes he fell over the broom, denies LOC. Currently notes head pain, denies any chest, abdomen, back pain, focal weakness, numbness, paresthesias.  Denies any recent illness or other falls.      History provided by:  Patient and medical records  History limited by:  Age   used: No                        Jeny Coma Scale Score: 15                     Patient History   Past Medical History:   Diagnosis Date    Aortic stenosis     Atrial fibrillation (Multi)     CHF (congestive heart failure) (Multi)     Diabetes mellitus (Multi)     GERD (gastroesophageal reflux disease)      Past Surgical History:   Procedure Laterality Date    APPENDECTOMY      CHOLECYSTECTOMY      OTHER SURGICAL HISTORY  10/24/2019    No history of surgery     Family History   Problem Relation Name Age of Onset    Other (pacemaker) Son       Social History     Tobacco Use    Smoking status: Former     Types: Cigarettes    Smokeless tobacco: Never   Vaping Use    Vaping status: Not on file   Substance Use Topics    Alcohol use: Not Currently    Drug use: Not Currently       Physical Exam   ED Triage Vitals [04/30/24 1252]   Temperature Heart Rate Respirations BP   36.6 °C (97.9 °F) 69 18 137/73      Pulse Ox Temp src Heart Rate Source Patient Position   96 % -- -- --      BP Location FiO2 (%)     -- --       Physical Exam  Constitutional:       Comments: HENT: Abrasions over the mid forehead and nasal bridge with tenderness over nasal bridge.  Midface stable, dentition intact w/o mobility, no mastoid tenderness, no ecchymosis, no hemotympanum, no septal  hematoma  Skin: Intact,  dry skin, no lesions, rash, petechiae or purpura.   Eyes: PERRLA, EOMs intact,  Conjunctiva pink with no redness or exudates. Cornea & anterior chamber are clear, Eyelids without lesions.   Neck: Supple, without meningismus. Trachea midline, no C-spine tenderness  Pulmonary: Clear bilaterally with symetric chest wall excursion. No stridor or distress  Cardiac: Normal S1, S2 without murmur, rub, gallop or extrasystole. No JVD, Carotids without bruits. Strong symmetric pulses appreciated  Abdomen: Soft, nontender. No palpable organomegaly.  No rebound or guarding.  No CVA tenderness.  Genitourinary: Normal external genitalia, no blood at meatus  Musculoskeletal: Tenderness over anterior right knee without accompanying abrasions or deformities.  Pelvis stable.   Neurological:  Cranial nerves II through XII are grossly intact, finger-nose touch is normal, normal sensation, no weakness, no focal findings identified.         ED Course & MDM   Diagnoses as of 04/30/24 1447   Fall, initial encounter   Coagulopathy (Multi)   Abrasion of face, initial encounter   Closed fracture of nasal bone, initial encounter       Medical Decision Making  83-year-old male here after mechanical fall.  Presents stable, no acute distress, mentating appropriately with a GCS of 15, afebrile and saturating well on room air.  Primary and secondary trauma evaluation notable for abrasions over the face and nose on the why is right knee tenderness with full range of motion, otherwise atraumatic.  CTs of the head, face, and C-spine obtained which show a nondisplaced left nasal fracture, right lower extremity x-rays obtained which show some soft tissue edema no acute fracture.  Patient's pain well-controlled, given a Boostrix, pain medication, abrasions cleaned out, with bacitracin applied, prescription for bacitracin given, patient ambulated in the ER, was sent home with a walker, follow-up.  Symptoms and exam not consistent  with a cardiac or neurologic cause of her fall, exam not consistent with aortic pathology.  His EKG was nonischemic with negative troponin, the rest of his labs are reassuring including a therapeutic INR of 2.3.    Amount and/or Complexity of Data Reviewed  External Data Reviewed: notes.  Labs: ordered.  Radiology: ordered and independent interpretation performed.  ECG/medicine tests: ordered and independent interpretation performed.     Details: Rate controlled atrial fibrillation, no ST changes, normal axis, prolonged QTc 472    Risk  Prescription drug management.  Diagnosis or treatment significantly limited by social determinants of health.        Procedure  Procedures     Mauricio Oneill MD  Resident  04/30/24 1450       Mauricio Oneill MD  Resident  04/30/24 2902

## 2024-05-03 LAB
Q ONSET: 221 MS
QRS COUNT: 10 BEATS
QRS DURATION: 80 MS
QT INTERVAL: 472 MS
QTC CALCULATION(BAZETT): 472 MS
QTC FREDERICIA: 472 MS
R AXIS: 27 DEGREES
T AXIS: 48 DEGREES
T OFFSET: 457 MS
VENTRICULAR RATE: 60 BPM

## 2024-05-06 ENCOUNTER — APPOINTMENT (OUTPATIENT)
Dept: RADIOLOGY | Facility: HOSPITAL | Age: 84
End: 2024-05-06
Payer: MEDICARE

## 2024-05-06 ENCOUNTER — HOSPITAL ENCOUNTER (EMERGENCY)
Facility: HOSPITAL | Age: 84
Discharge: HOME | End: 2024-05-06
Attending: EMERGENCY MEDICINE
Payer: MEDICARE

## 2024-05-06 VITALS
SYSTOLIC BLOOD PRESSURE: 103 MMHG | HEIGHT: 73 IN | RESPIRATION RATE: 20 BRPM | TEMPERATURE: 98.1 F | WEIGHT: 225 LBS | DIASTOLIC BLOOD PRESSURE: 91 MMHG | BODY MASS INDEX: 29.82 KG/M2 | OXYGEN SATURATION: 98 % | HEART RATE: 78 BPM

## 2024-05-06 DIAGNOSIS — M25.569 KNEE PAIN, UNSPECIFIED CHRONICITY, UNSPECIFIED LATERALITY: Primary | ICD-10-CM

## 2024-05-06 LAB
ALBUMIN SERPL BCP-MCNC: 3.9 G/DL (ref 3.4–5)
ALP SERPL-CCNC: 96 U/L (ref 33–136)
ALT SERPL W P-5'-P-CCNC: 11 U/L (ref 10–52)
ANION GAP BLDV CALCULATED.4IONS-SCNC: 10 MMOL/L (ref 10–25)
ANION GAP SERPL CALC-SCNC: 16 MMOL/L (ref 10–20)
APTT PPP: 39 SECONDS (ref 27–38)
AST SERPL W P-5'-P-CCNC: 15 U/L (ref 9–39)
BASE EXCESS BLDV CALC-SCNC: 3.7 MMOL/L (ref -2–3)
BASOPHILS # BLD AUTO: 0.04 X10*3/UL (ref 0–0.1)
BASOPHILS NFR BLD AUTO: 0.5 %
BILIRUB SERPL-MCNC: 0.9 MG/DL (ref 0–1.2)
BODY TEMPERATURE: ABNORMAL
BUN SERPL-MCNC: 19 MG/DL (ref 6–23)
CA-I BLDV-SCNC: 1.13 MMOL/L (ref 1.1–1.33)
CALCIUM SERPL-MCNC: 8.6 MG/DL (ref 8.6–10.3)
CHLORIDE BLDV-SCNC: 103 MMOL/L (ref 98–107)
CHLORIDE SERPL-SCNC: 102 MMOL/L (ref 98–107)
CO2 SERPL-SCNC: 27 MMOL/L (ref 21–32)
CREAT SERPL-MCNC: 1.2 MG/DL (ref 0.5–1.3)
EGFRCR SERPLBLD CKD-EPI 2021: 60 ML/MIN/1.73M*2
EOSINOPHIL # BLD AUTO: 0.18 X10*3/UL (ref 0–0.4)
EOSINOPHIL NFR BLD AUTO: 2.3 %
ERYTHROCYTE [DISTWIDTH] IN BLOOD BY AUTOMATED COUNT: 14.1 % (ref 11.5–14.5)
GLUCOSE BLDV-MCNC: 94 MG/DL (ref 74–99)
GLUCOSE SERPL-MCNC: 96 MG/DL (ref 74–99)
HCO3 BLDV-SCNC: 28.5 MMOL/L (ref 22–26)
HCT VFR BLD AUTO: 39.1 % (ref 41–52)
HCT VFR BLD EST: 40 % (ref 41–52)
HGB BLD-MCNC: 12.8 G/DL (ref 13.5–17.5)
HGB BLDV-MCNC: 13.4 G/DL (ref 13.5–17.5)
IMM GRANULOCYTES # BLD AUTO: 0.03 X10*3/UL (ref 0–0.5)
IMM GRANULOCYTES NFR BLD AUTO: 0.4 % (ref 0–0.9)
INHALED O2 CONCENTRATION: 21 %
INR PPP: 2.5 (ref 0.9–1.1)
LACTATE BLDV-SCNC: 1.5 MMOL/L (ref 0.4–2)
LYMPHOCYTES # BLD AUTO: 1.9 X10*3/UL (ref 0.8–3)
LYMPHOCYTES NFR BLD AUTO: 24.6 %
MCH RBC QN AUTO: 30.3 PG (ref 26–34)
MCHC RBC AUTO-ENTMCNC: 32.7 G/DL (ref 32–36)
MCV RBC AUTO: 92 FL (ref 80–100)
MONOCYTES # BLD AUTO: 0.76 X10*3/UL (ref 0.05–0.8)
MONOCYTES NFR BLD AUTO: 9.8 %
NEUTROPHILS # BLD AUTO: 4.81 X10*3/UL (ref 1.6–5.5)
NEUTROPHILS NFR BLD AUTO: 62.4 %
NRBC BLD-RTO: 0 /100 WBCS (ref 0–0)
OXYHGB MFR BLDV: 64.4 % (ref 45–75)
PCO2 BLDV: 43 MM HG (ref 41–51)
PH BLDV: 7.43 PH (ref 7.33–7.43)
PLATELET # BLD AUTO: 253 X10*3/UL (ref 150–450)
PO2 BLDV: 39 MM HG (ref 35–45)
POTASSIUM BLDV-SCNC: 3.7 MMOL/L (ref 3.5–5.3)
POTASSIUM SERPL-SCNC: 3.7 MMOL/L (ref 3.5–5.3)
PROT SERPL-MCNC: 6.8 G/DL (ref 6.4–8.2)
PROTHROMBIN TIME: 28.3 SECONDS (ref 9.8–12.8)
RBC # BLD AUTO: 4.23 X10*6/UL (ref 4.5–5.9)
SAO2 % BLDV: 66 % (ref 45–75)
SODIUM BLDV-SCNC: 138 MMOL/L (ref 136–145)
SODIUM SERPL-SCNC: 141 MMOL/L (ref 136–145)
WBC # BLD AUTO: 7.7 X10*3/UL (ref 4.4–11.3)

## 2024-05-06 PROCEDURE — 73564 X-RAY EXAM KNEE 4 OR MORE: CPT | Mod: RT

## 2024-05-06 PROCEDURE — 85025 COMPLETE CBC W/AUTO DIFF WBC: CPT | Performed by: STUDENT IN AN ORGANIZED HEALTH CARE EDUCATION/TRAINING PROGRAM

## 2024-05-06 PROCEDURE — 85730 THROMBOPLASTIN TIME PARTIAL: CPT | Performed by: STUDENT IN AN ORGANIZED HEALTH CARE EDUCATION/TRAINING PROGRAM

## 2024-05-06 PROCEDURE — 73700 CT LOWER EXTREMITY W/O DYE: CPT | Mod: RT

## 2024-05-06 PROCEDURE — 73700 CT LOWER EXTREMITY W/O DYE: CPT | Mod: RIGHT SIDE | Performed by: RADIOLOGY

## 2024-05-06 PROCEDURE — 99284 EMERGENCY DEPT VISIT MOD MDM: CPT | Mod: 25

## 2024-05-06 PROCEDURE — 84132 ASSAY OF SERUM POTASSIUM: CPT | Mod: 91 | Performed by: STUDENT IN AN ORGANIZED HEALTH CARE EDUCATION/TRAINING PROGRAM

## 2024-05-06 PROCEDURE — 36415 COLL VENOUS BLD VENIPUNCTURE: CPT | Performed by: STUDENT IN AN ORGANIZED HEALTH CARE EDUCATION/TRAINING PROGRAM

## 2024-05-06 PROCEDURE — 73564 X-RAY EXAM KNEE 4 OR MORE: CPT | Mod: RIGHT SIDE | Performed by: RADIOLOGY

## 2024-05-06 PROCEDURE — 85610 PROTHROMBIN TIME: CPT | Performed by: STUDENT IN AN ORGANIZED HEALTH CARE EDUCATION/TRAINING PROGRAM

## 2024-05-06 ASSESSMENT — LIFESTYLE VARIABLES
HAVE YOU EVER FELT YOU SHOULD CUT DOWN ON YOUR DRINKING: NO
EVER HAD A DRINK FIRST THING IN THE MORNING TO STEADY YOUR NERVES TO GET RID OF A HANGOVER: NO
TOTAL SCORE: 0
EVER FELT BAD OR GUILTY ABOUT YOUR DRINKING: NO
HAVE PEOPLE ANNOYED YOU BY CRITICIZING YOUR DRINKING: NO

## 2024-05-06 ASSESSMENT — COLUMBIA-SUICIDE SEVERITY RATING SCALE - C-SSRS
1. IN THE PAST MONTH, HAVE YOU WISHED YOU WERE DEAD OR WISHED YOU COULD GO TO SLEEP AND NOT WAKE UP?: NO
2. HAVE YOU ACTUALLY HAD ANY THOUGHTS OF KILLING YOURSELF?: NO
6. HAVE YOU EVER DONE ANYTHING, STARTED TO DO ANYTHING, OR PREPARED TO DO ANYTHING TO END YOUR LIFE?: NO

## 2024-05-06 ASSESSMENT — PAIN DESCRIPTION - ORIENTATION: ORIENTATION: RIGHT

## 2024-05-06 ASSESSMENT — PAIN - FUNCTIONAL ASSESSMENT: PAIN_FUNCTIONAL_ASSESSMENT: 0-10

## 2024-05-06 ASSESSMENT — PAIN DESCRIPTION - LOCATION: LOCATION: KNEE

## 2024-05-06 ASSESSMENT — PAIN SCALES - GENERAL: PAINLEVEL_OUTOF10: 7

## 2024-05-06 NOTE — ED PROVIDER NOTES
HPI   Chief Complaint   Patient presents with    Knee Injury     Fell Tuesday,  right leg is black and blue and still swollen        83-year-old male with atrial fibrillation on warfarin, aortic stenosis, CHF here approximately 1 week after a mechanical fall.  Was seen at that time, diagnosed with a nasal bone fracture and discharged, x-rays of the knee at that time were negative for fracture.  He states since then he has had the same amount of pain, but spread of ecchymoses from the mid thigh down to the distal tib-fib.  Denies any weakness, numbness or paresthesias, still been able to ambulate with his walker.  Denies any fevers or infectious symptoms, notes he has continued to take his warfarin.  Denies any further falls.      History provided by:  Patient and medical records   used: No                        No data recorded                   Patient History   Past Medical History:   Diagnosis Date    Aortic stenosis     Atrial fibrillation (Multi)     CHF (congestive heart failure) (Multi)     Diabetes mellitus (Multi)     GERD (gastroesophageal reflux disease)      Past Surgical History:   Procedure Laterality Date    APPENDECTOMY      CHOLECYSTECTOMY      OTHER SURGICAL HISTORY  10/24/2019    No history of surgery     Family History   Problem Relation Name Age of Onset    Other (pacemaker) Son       Social History     Tobacco Use    Smoking status: Former     Types: Cigarettes    Smokeless tobacco: Never   Vaping Use    Vaping status: Not on file   Substance Use Topics    Alcohol use: Not Currently    Drug use: Not Currently       Physical Exam   ED Triage Vitals [05/06/24 1259]   Temperature Heart Rate Respirations BP   36.7 °C (98.1 °F) 71 18 115/71      Pulse Ox Temp Source Heart Rate Source Patient Position   96 % Skin Monitor Sitting      BP Location FiO2 (%)     Left arm --       Physical Exam  Constitutional:       Appearance: Normal appearance.   HENT:      Head:      Comments:  Ecchymoses of the mid face without tenderness to palpation or new lesions  Musculoskeletal:      Comments: Extensive ecchymoses from the right mid femur to the distal tib-fib with accompanying edema.  No crepitus, 2+ DP and PT pulses with intact sensation and full range of motion.  Compartments soft.  Significant tenderness over the knee, as well as tibial plateau without palpable deformities.   Skin:     General: Skin is warm and dry.      Capillary Refill: Capillary refill takes less than 2 seconds.   Neurological:      General: No focal deficit present.      Mental Status: He is alert and oriented to person, place, and time.   Psychiatric:         Mood and Affect: Mood normal.         Behavior: Behavior normal.         ED Course & MDM   Diagnoses as of 05/06/24 1643   Knee pain, unspecified chronicity, unspecified laterality       Medical Decision Making  83-year-old male with recent knee injury here with worsened ecchymoses.  His exam shows intact pulses, soft compartments with no signs of compartment syndrome, necrotizing fasciitis, or active bleeding.  CT of the knee obtained which does not disclose an occult fracture.  I suspect his ecchymoses are as profound as they are secondary to him being on anticoagulation, did have a 1.5 drop in his hemoglobin from recent evaluation, offered him observation given the extending ecchymoses and anticoagulation use, he notes understanding, has capacity, and wishes to be discharged.  He has a therapeutic INR 2.5, counseled him to follow-up with his primary care physician or return with new or worsening symptoms.    Amount and/or Complexity of Data Reviewed  External Data Reviewed: notes.  Labs: ordered.  Radiology: ordered and independent interpretation performed.    Risk  Diagnosis or treatment significantly limited by social determinants of health.        Procedure  Procedures     Mauricio Oneill MD  Resident  05/06/24 1643

## 2024-08-14 ENCOUNTER — APPOINTMENT (OUTPATIENT)
Dept: CARDIOLOGY | Facility: HOSPITAL | Age: 84
End: 2024-08-14
Payer: MEDICARE

## 2024-09-04 ENCOUNTER — OFFICE VISIT (OUTPATIENT)
Dept: CARDIOLOGY | Facility: HOSPITAL | Age: 84
End: 2024-09-04
Payer: MEDICARE

## 2024-09-04 ENCOUNTER — HOSPITAL ENCOUNTER (OUTPATIENT)
Dept: CARDIOLOGY | Facility: HOSPITAL | Age: 84
Discharge: HOME | End: 2024-09-04
Payer: MEDICARE

## 2024-09-04 VITALS
BODY MASS INDEX: 30.95 KG/M2 | HEART RATE: 59 BPM | SYSTOLIC BLOOD PRESSURE: 132 MMHG | WEIGHT: 234.57 LBS | DIASTOLIC BLOOD PRESSURE: 77 MMHG

## 2024-09-04 DIAGNOSIS — I48.91 ATRIAL FIBRILLATION, UNSPECIFIED TYPE (MULTI): ICD-10-CM

## 2024-09-04 DIAGNOSIS — I51.89 DIASTOLIC DYSFUNCTION: ICD-10-CM

## 2024-09-04 DIAGNOSIS — I35.0 NONRHEUMATIC AORTIC VALVE STENOSIS: ICD-10-CM

## 2024-09-04 DIAGNOSIS — I35.0 AORTIC VALVE STENOSIS, ETIOLOGY OF CARDIAC VALVE DISEASE UNSPECIFIED: Primary | ICD-10-CM

## 2024-09-04 DIAGNOSIS — I50.9 DECOMPENSATED HEART FAILURE (MULTI): ICD-10-CM

## 2024-09-04 LAB
AORTIC VALVE MEAN GRADIENT: 25.6 MMHG
AORTIC VALVE PEAK VELOCITY: 3.34 M/S
AV PEAK GRADIENT: 44.7 MMHG
AVA (PEAK VEL): 0.78 CM2
AVA (VTI): 0.7 CM2
EJECTION FRACTION APICAL 4 CHAMBER: 43.1
EJECTION FRACTION: 58 %
LEFT ATRIUM VOLUME AREA LENGTH INDEX BSA: 38.2 ML/M2
LEFT VENTRICULAR OUTFLOW TRACT DIAMETER: 2 CM
RIGHT VENTRICLE FREE WALL PEAK S': 8 CM/S
RIGHT VENTRICLE PEAK SYSTOLIC PRESSURE: 55 MMHG
TRICUSPID ANNULAR PLANE SYSTOLIC EXCURSION: 1 CM

## 2024-09-04 PROCEDURE — 93306 TTE W/DOPPLER COMPLETE: CPT

## 2024-09-04 PROCEDURE — 93306 TTE W/DOPPLER COMPLETE: CPT | Performed by: INTERNAL MEDICINE

## 2024-09-04 PROCEDURE — 1159F MED LIST DOCD IN RCRD: CPT | Performed by: INTERNAL MEDICINE

## 2024-09-04 PROCEDURE — 93005 ELECTROCARDIOGRAM TRACING: CPT | Performed by: INTERNAL MEDICINE

## 2024-09-04 PROCEDURE — 1036F TOBACCO NON-USER: CPT | Performed by: INTERNAL MEDICINE

## 2024-09-04 PROCEDURE — 99214 OFFICE O/P EST MOD 30 MIN: CPT | Performed by: INTERNAL MEDICINE

## 2024-09-04 RX ORDER — SPIRONOLACTONE 25 MG/1
25 TABLET ORAL DAILY
Qty: 30 TABLET | Refills: 11 | Status: SHIPPED | OUTPATIENT
Start: 2024-09-04 | End: 2025-09-04

## 2024-09-04 NOTE — PROGRESS NOTES
Chief Complaint:   No chief complaint on file.     History Of Present Illness:    Antonio Shah is a 84 y.o. male presenting for follow-up.  Has noticed some shortness of breath on exertion.  No angina or syncope/dizziness.  Compliant medications.     Last Recorded Vitals:  Vitals:    09/04/24 1104   BP: 132/77   Pulse: 59   Weight: 106 kg (234 lb 9.1 oz)       Past Medical History:  He has a past medical history of Aortic stenosis, Atrial fibrillation (Multi), CHF (congestive heart failure) (Multi), Diabetes mellitus (Multi), and GERD (gastroesophageal reflux disease).    Past Surgical History:  He has a past surgical history that includes Other surgical history (10/24/2019); Cholecystectomy; and Appendectomy.      Social History:  He reports that he has quit smoking. His smoking use included cigarettes. He has never used smokeless tobacco. He reports that he does not currently use alcohol. He reports that he does not currently use drugs.    Family History:  Family History   Problem Relation Name Age of Onset    Other (pacemaker) Son          Allergies:  Acetaminophen and Codeine    Outpatient Medications:  Current Outpatient Medications   Medication Instructions    acetaminophen (TYLENOL) 650 mg, oral, Every 6 hours PRN    albuterol 90 mcg/actuation inhaler 2 puffs, inhalation, Every 6 hours PRN    furosemide (LASIX) 40 mg, oral, Daily    magnesium oxide (MAG-OX) 400 mg, Daily    metFORMIN (OSM) (FORTAMET) 500 mg, oral, Do not crush, chew, or split.    metoprolol succinate XL (TOPROL-XL) 25 mg, oral, Daily, Do not crush or chew.    metoprolol tartrate (LOPRESSOR) 25 mg, oral, Daily    omeprazole (PRILOSEC) 20 mg, oral, Daily before breakfast, Do not crush or chew.    psyllium (Metamucil) 3.4 gram packet 1 packet, oral, Daily    warfarin (Coumadin) 6 mg tablet 1 tablet, oral, Every other day, Take as directed per After Visit Summary.<BR>According to patient<BR><BR>Patient to follow up with PCP regarding PT/INR and  "any medication changes.       Physical Exam:  Constitutional:       Appearance: Healthy appearance. Not in distress.   Neck:      Vascular: No JVR. JVD normal.   Pulmonary:      Effort: Pulmonary effort is normal.      Breath sounds: Normal breath sounds. No wheezing. No rhonchi. No rales.   Chest:      Chest wall: Not tender to palpatation.   Cardiovascular:      Normal rate. Irregularly irregular rhythm.      Murmurs: There is a harsh midsystolic murmur at the URSB, radiating to the neck.   Edema:     Peripheral edema absent.   Abdominal:      General: Bowel sounds are normal.      Palpations: Abdomen is soft.      Tenderness: There is no abdominal tenderness.   Musculoskeletal: Normal range of motion. Skin:     General: Skin is warm and dry.   Neurological:      General: No focal deficit present.      Mental Status: Alert and oriented to person, place and time.           Last Labs:  CBC -  Lab Results   Component Value Date    WBC 7.7 05/06/2024    HGB 12.8 (L) 05/06/2024    HCT 39.1 (L) 05/06/2024    MCV 92 05/06/2024     05/06/2024       CMP -  Lab Results   Component Value Date    CALCIUM 8.6 05/06/2024    PHOS 3.2 01/05/2024    PROT 6.8 05/06/2024    ALBUMIN 3.9 05/06/2024    AST 15 05/06/2024    ALT 11 05/06/2024    ALKPHOS 96 05/06/2024    BILITOT 0.9 05/06/2024       LIPID PANEL -   No results found for: \"CHOL\", \"TRIG\", \"HDL\", \"CHHDL\", \"LDLF\", \"VLDL\", \"NHDL\"    RENAL FUNCTION PANEL -   Lab Results   Component Value Date    GLUCOSE 96 05/06/2024     05/06/2024    K 3.7 05/06/2024     05/06/2024    CO2 27 05/06/2024    ANIONGAP 16 05/06/2024    BUN 19 05/06/2024    CREATININE 1.20 05/06/2024    GFRMALE 76 02/12/2022    CALCIUM 8.6 05/06/2024    PHOS 3.2 01/05/2024    ALBUMIN 3.9 05/06/2024        Lab Results   Component Value Date     (H) 01/03/2024    HGBA1C 6.0 08/29/2019       Last Cardiology Tests:  ECG independently reviewed from today: Atrial fibrillation, rate 51    Echo " 9/4/24:   1. The left ventricular systolic function is normal, with a visually estimated ejection fraction of 55-60%.   2. Spectral Doppler shows an abnormal pattern of left ventricular diastolic filling.   3. There is normal right ventricular global systolic function.   4. The left atrium is moderately dilated.   5. Moderate to severe aortic valve stenosis.   6. Mild aortic valve regurgitation.   7. Moderately elevated pulmonary artery pressure, the estimated pulmonary artery pressure is moderately elevated with the RVSP at 55.0 mmHg. .   8. There is mild mitral and tricuspid regurgitation.   9. The patient is in atrial fibrillation which may influence the estimate of left ventricular function and transvalvular flows.    Echo:10/30/20   1. The left ventricular systolic function is normal with a 55% estimated ejection fraction.   2. Spectral Doppler shows an abnormal pattern of left ventricular diastolic filling.   3. Moderate aortic valve stenosis.   4. There is mild mitral and tricuspid regurgitation.   5. The estimated pulmonary artery pressure is mildly elevated with the RVSP at 39.4 mmHg.    Assessment/Plan   Very pleasant 84 y.o. male with a history of aFib on coumadin, aortic stenosis.  Overall doing okay from a cardiac standpoint--does have some noted dyspnea on exertion.  Echocardiogram today shows moderate to severe aortic stenosis (suspect more moderate in nature).  RVSP was 55 on echo.  I think that his symptoms are probably more related to diastolic dysfunction than aortic stenosis at this time, however we will keep a close eye on this.  I am to start him on spironolactone 25 mg daily and we will continue current Lasix, metoprolol, and warfarin.  Follow-up in 6 months or sooner if needed with repeat echocardiogram at that time for surveillance of aortic stenosis.          Tommie Patrick MD

## 2024-09-06 LAB
ATRIAL RATE: 44 BPM
Q ONSET: 222 MS
QRS COUNT: 8 BEATS
QRS DURATION: 84 MS
QT INTERVAL: 520 MS
QTC CALCULATION(BAZETT): 479 MS
QTC FREDERICIA: 493 MS
R AXIS: 38 DEGREES
T AXIS: 51 DEGREES
T OFFSET: 482 MS
VENTRICULAR RATE: 51 BPM

## 2025-03-13 ENCOUNTER — HOSPITAL ENCOUNTER (OUTPATIENT)
Dept: CARDIOLOGY | Facility: HOSPITAL | Age: 85
Discharge: HOME | End: 2025-03-13
Payer: MEDICARE

## 2025-03-13 ENCOUNTER — OFFICE VISIT (OUTPATIENT)
Dept: CARDIOLOGY | Facility: HOSPITAL | Age: 85
End: 2025-03-13
Payer: MEDICARE

## 2025-03-13 VITALS
OXYGEN SATURATION: 97 % | HEART RATE: 67 BPM | WEIGHT: 236.77 LBS | DIASTOLIC BLOOD PRESSURE: 76 MMHG | BODY MASS INDEX: 31.24 KG/M2 | SYSTOLIC BLOOD PRESSURE: 118 MMHG

## 2025-03-13 DIAGNOSIS — I48.11 LONGSTANDING PERSISTENT ATRIAL FIBRILLATION (MULTI): ICD-10-CM

## 2025-03-13 DIAGNOSIS — I35.0 AORTIC VALVE STENOSIS, ETIOLOGY OF CARDIAC VALVE DISEASE UNSPECIFIED: ICD-10-CM

## 2025-03-13 DIAGNOSIS — I35.0 AORTIC VALVE STENOSIS, ETIOLOGY OF CARDIAC VALVE DISEASE UNSPECIFIED: Primary | ICD-10-CM

## 2025-03-13 LAB
ATRIAL RATE: 375 BPM
Q ONSET: 225 MS
QRS COUNT: 11 BEATS
QRS DURATION: 78 MS
QT INTERVAL: 442 MS
QTC CALCULATION(BAZETT): 448 MS
QTC FREDERICIA: 447 MS
R AXIS: 62 DEGREES
T AXIS: 8 DEGREES
T OFFSET: 446 MS
VENTRICULAR RATE: 62 BPM

## 2025-03-13 PROCEDURE — 93005 ELECTROCARDIOGRAM TRACING: CPT | Performed by: INTERNAL MEDICINE

## 2025-03-13 PROCEDURE — 1159F MED LIST DOCD IN RCRD: CPT | Performed by: INTERNAL MEDICINE

## 2025-03-13 PROCEDURE — G2211 COMPLEX E/M VISIT ADD ON: HCPCS | Performed by: INTERNAL MEDICINE

## 2025-03-13 PROCEDURE — 93306 TTE W/DOPPLER COMPLETE: CPT

## 2025-03-13 PROCEDURE — 99215 OFFICE O/P EST HI 40 MIN: CPT | Performed by: INTERNAL MEDICINE

## 2025-03-13 PROCEDURE — 99215 OFFICE O/P EST HI 40 MIN: CPT | Mod: 25 | Performed by: INTERNAL MEDICINE

## 2025-03-13 NOTE — PROGRESS NOTES
Chief Complaint:   No chief complaint on file.     History Of Present Illness:    Antonio Shah is a 84 y.o. male presenting with ***.     Last Recorded Vitals:  Vitals:    03/13/25 1110   BP: 118/76   Pulse: 67   SpO2: 97%   Weight: 107 kg (236 lb 12.4 oz)       Past Medical History:  He has a past medical history of Aortic stenosis, Atrial fibrillation (Multi), CHF (congestive heart failure), Diabetes mellitus (Multi), and GERD (gastroesophageal reflux disease).    Past Surgical History:  He has a past surgical history that includes Other surgical history (10/24/2019); Cholecystectomy; and Appendectomy.      Social History:  He reports that he has quit smoking. His smoking use included cigarettes. He has never used smokeless tobacco. He reports that he does not currently use alcohol. He reports that he does not currently use drugs.    Family History:  Family History   Problem Relation Name Age of Onset    Other (pacemaker) Son          Allergies:  Acetaminophen and Codeine    Outpatient Medications:  Current Outpatient Medications   Medication Instructions    acetaminophen (TYLENOL) 650 mg, Every 6 hours PRN    albuterol 90 mcg/actuation inhaler 2 puffs, inhalation, Every 6 hours PRN    furosemide (LASIX) 40 mg, Daily    magnesium oxide (MAG-OX) 400 mg, Daily    metFORMIN (OSM) (FORTAMET) 500 mg    metoprolol succinate XL (TOPROL-XL) 25 mg, oral, Daily, Do not crush or chew.    metoprolol tartrate (LOPRESSOR) 25 mg, Daily    omeprazole (PRILOSEC) 20 mg, Daily before breakfast    psyllium (Metamucil) 3.4 gram packet 1 packet, Daily    spironolactone (ALDACTONE) 25 mg, oral, Daily    warfarin (Coumadin) 6 mg tablet 1 tablet, Every other day       Physical Exam:  ***     Last Labs:  CBC -  Lab Results   Component Value Date    WBC 7.7 05/06/2024    HGB 12.8 (L) 05/06/2024    HCT 39.1 (L) 05/06/2024    MCV 92 05/06/2024     05/06/2024       CMP -  Lab Results   Component Value Date    CALCIUM 8.6 05/06/2024     "PHOS 3.2 01/05/2024    PROT 6.8 05/06/2024    ALBUMIN 3.9 05/06/2024    AST 15 05/06/2024    ALT 11 05/06/2024    ALKPHOS 96 05/06/2024    BILITOT 0.9 05/06/2024       LIPID PANEL -   No results found for: \"CHOL\", \"TRIG\", \"HDL\", \"CHHDL\", \"LDLF\", \"VLDL\", \"NHDL\"    RENAL FUNCTION PANEL -   Lab Results   Component Value Date    GLUCOSE 96 05/06/2024     05/06/2024    K 3.7 05/06/2024     05/06/2024    CO2 27 05/06/2024    ANIONGAP 16 05/06/2024    BUN 19 05/06/2024    CREATININE 1.20 05/06/2024    GFRMALE 76 02/12/2022    CALCIUM 8.6 05/06/2024    PHOS 3.2 01/05/2024    ALBUMIN 3.9 05/06/2024        Lab Results   Component Value Date     (H) 01/03/2024    HGBA1C 6.0 08/29/2019       Last Cardiology Tests:  ECG independently reviewed from today: Atrial fibrillation, rate 51     Echo 9/4/24:   1. The left ventricular systolic function is normal, with a visually estimated ejection fraction of 55-60%.   2. Spectral Doppler shows an abnormal pattern of left ventricular diastolic filling.   3. There is normal right ventricular global systolic function.   4. The left atrium is moderately dilated.   5. Moderate to severe aortic valve stenosis.   6. Mild aortic valve regurgitation.   7. Moderately elevated pulmonary artery pressure, the estimated pulmonary artery pressure is moderately elevated with the RVSP at 55.0 mmHg. .   8. There is mild mitral and tricuspid regurgitation.   9. The patient is in atrial fibrillation which may influence the estimate of left ventricular function and transvalvular flows.     Echo:10/30/20   1. The left ventricular systolic function is normal with a 55% estimated ejection fraction.   2. Spectral Doppler shows an abnormal pattern of left ventricular diastolic filling.   3. Moderate aortic valve stenosis.   4. There is mild mitral and tricuspid regurgitation.   5. The estimated pulmonary artery pressure is mildly elevated with the RVSP at 39.4 mmHg.    Cath 9/2019:  1. Left " main: 40-50% distal stenosis.   2. LAD: 50% proximal disease, 80% mid-D1 stenosis.   3. LCx: 50-60% proximal disease.   4. RCA: 40% mid-vessel disease, 70% proximal disease.   5. LVEDP 20mmHg, 15mm peak-peak gradient.   6. IFR of LM-LAD : 0.91.    Assessment/Plan   Very pleasant 84 y.o. male with a history of aFib on coumadin, aortic stenosis.  Overall doing okay from a cardiac standpoint--does have some noted dyspnea on exertion.  Echocardiogram today shows moderate to severe aortic stenosis (suspect more moderate in nature).  RVSP was 55 on echo.  I think that his symptoms are probably more related to diastolic dysfunction than aortic stenosis at this time, however we will keep a close eye on this.  I am to start him on spironolactone 25 mg daily and we will continue current Lasix, metoprolol, and warfarin.  Follow-up in 6 months or sooner if needed with repeat echocardiogram at that time for surveillance of aortic stenosis.       Tommie Patrick MD   is time to consider TAVR.  Plan for R/LHC and will refer to structural heart center as warranted.      Tommie Patrick MD

## 2025-03-25 ENCOUNTER — HOSPITAL ENCOUNTER (OUTPATIENT)
Dept: RADIOLOGY | Facility: HOSPITAL | Age: 85
Discharge: HOME | End: 2025-03-25
Payer: MEDICARE

## 2025-03-25 DIAGNOSIS — R91.8 OTHER NONSPECIFIC ABNORMAL FINDING OF LUNG FIELD: ICD-10-CM

## 2025-03-25 PROCEDURE — 71250 CT THORAX DX C-: CPT

## 2025-03-31 LAB
AORTIC VALVE MEAN GRADIENT: 24 MMHG
AORTIC VALVE PEAK VELOCITY: 3.23 M/S
AV PEAK GRADIENT: 42 MMHG
AVA (PEAK VEL): 0.75 CM2
AVA (VTI): 0.73 CM2
EJECTION FRACTION APICAL 4 CHAMBER: 58.3
EJECTION FRACTION: 58 %
LEFT ATRIUM VOLUME AREA LENGTH INDEX BSA: 36.2 ML/M2
LEFT VENTRICLE INTERNAL DIMENSION DIASTOLE: 4.42 CM (ref 3.5–6)
LEFT VENTRICULAR OUTFLOW TRACT DIAMETER: 2 CM
MITRAL VALVE E/A RATIO: 3.58
RIGHT VENTRICLE FREE WALL PEAK S': 7.62 CM/S
RIGHT VENTRICLE PEAK SYSTOLIC PRESSURE: 48.7 MMHG
TRICUSPID ANNULAR PLANE SYSTOLIC EXCURSION: 1.9 CM

## 2025-04-09 LAB
ATRIAL RATE: 375 BPM
ATRIAL RATE: 44 BPM
Q ONSET: 222 MS
Q ONSET: 225 MS
QRS COUNT: 11 BEATS
QRS COUNT: 8 BEATS
QRS DURATION: 78 MS
QRS DURATION: 84 MS
QT INTERVAL: 442 MS
QT INTERVAL: 520 MS
QTC CALCULATION(BAZETT): 448 MS
QTC CALCULATION(BAZETT): 479 MS
QTC FREDERICIA: 447 MS
QTC FREDERICIA: 493 MS
R AXIS: 38 DEGREES
R AXIS: 62 DEGREES
T AXIS: 51 DEGREES
T AXIS: 8 DEGREES
T OFFSET: 446 MS
T OFFSET: 482 MS
VENTRICULAR RATE: 51 BPM
VENTRICULAR RATE: 62 BPM

## 2025-04-21 ENCOUNTER — HOSPITAL ENCOUNTER (OUTPATIENT)
Facility: HOSPITAL | Age: 85
Setting detail: OUTPATIENT SURGERY
Discharge: HOME | End: 2025-04-21
Attending: INTERNAL MEDICINE | Admitting: INTERNAL MEDICINE
Payer: MEDICARE

## 2025-04-21 ASSESSMENT — PAIN SCALES - GENERAL
PAINLEVEL_OUTOF10: 0 - NO PAIN

## 2025-04-21 ASSESSMENT — PAIN - FUNCTIONAL ASSESSMENT
PAIN_FUNCTIONAL_ASSESSMENT: 0-10

## 2025-04-21 ASSESSMENT — ENCOUNTER SYMPTOMS
MUSCULOSKELETAL NEGATIVE: 1
SHORTNESS OF BREATH: 1
ALLERGIC/IMMUNOLOGIC NEGATIVE: 1
EYES NEGATIVE: 1
CONSTITUTIONAL NEGATIVE: 1
PSYCHIATRIC NEGATIVE: 1
NEUROLOGICAL NEGATIVE: 1
HEMATOLOGIC/LYMPHATIC NEGATIVE: 1
GASTROINTESTINAL NEGATIVE: 1
CARDIOVASCULAR NEGATIVE: 1
ENDOCRINE NEGATIVE: 1

## 2025-04-21 NOTE — H&P
"History Of Present Illness  Zackary Shah \"Antonio\" is a 84 y.o. male from home with h/o non-obstructive CAD (LHC 8/2019 dLM 40-50 (iFR LM-LAD 0.91), LAD p50, D1 mid 80, Lcx 50-60, RCA p70/m40), moderate to severe aortic stenosis, type 2 DM, htn, hld, PAF on warfarin presenting today for R+LHC in the setting of severe aortic stenosis pending structural evaluation to discuss TAVR.      Past Medical History  Medical History[1]    Surgical History  Surgical History[2]     Social History  He reports that he has quit smoking. His smoking use included cigarettes. He has never used smokeless tobacco. He reports that he does not currently use alcohol. He reports that he does not currently use drugs.    Family History  Family History[3]     Allergies  Acetaminophen and Codeine    Review of Systems   Constitutional: Negative.    HENT: Negative.     Eyes: Negative.    Respiratory:  Positive for shortness of breath.    Cardiovascular: Negative.    Gastrointestinal: Negative.    Endocrine: Negative.    Genitourinary: Negative.    Musculoskeletal: Negative.    Skin: Negative.    Allergic/Immunologic: Negative.    Neurological: Negative.    Hematological: Negative.    Psychiatric/Behavioral: Negative.          Physical Exam  Constitutional:       Appearance: Normal appearance.   HENT:      Head: Normocephalic and atraumatic.      Nose: Nose normal.      Mouth/Throat:      Mouth: Mucous membranes are moist.      Pharynx: Oropharynx is clear.   Cardiovascular:      Rate and Rhythm: Normal rate and regular rhythm.      Pulses: Normal pulses.      Heart sounds: Normal heart sounds.   Pulmonary:      Effort: Pulmonary effort is normal.      Breath sounds: Normal breath sounds.   Abdominal:      General: Abdomen is flat. Bowel sounds are normal.   Musculoskeletal:         General: Normal range of motion.      Cervical back: Normal range of motion and neck supple.   Skin:     General: Skin is warm and dry.      Capillary Refill: Capillary " "refill takes 2 to 3 seconds.   Neurological:      General: No focal deficit present.      Mental Status: He is alert.   Psychiatric:         Mood and Affect: Mood normal.         Behavior: Behavior normal.          Last Recorded Vitals  There were no vitals taken for this visit.    Relevant Results             Assessment & Plan  Aortic valve stenosis      Zackary Shah \"Don\" is a 84 y.o. male from home with h/o non-obstructive CAD (LHC 8/2019 dLM 40-50 (iFR LM-LAD 0.91), LAD p50, D1 mid 80, Lcx 50-60, RCA p70/m40), moderate to severe aortic stenosis, type 2 DM, htn, hld, PAF on warfarin presenting today for R+C in the setting of severe aortic stenosis pending structural evaluation to discuss TAVR.       Ese Howard, APRN-CNP         [1]   Past Medical History:  Diagnosis Date    Aortic stenosis     Atrial fibrillation (Multi)     CHF (congestive heart failure)     Diabetes mellitus (Multi)     GERD (gastroesophageal reflux disease)    [2]   Past Surgical History:  Procedure Laterality Date    APPENDECTOMY      CHOLECYSTECTOMY      OTHER SURGICAL HISTORY  10/24/2019    No history of surgery   [3]   Family History  Problem Relation Name Age of Onset    Other (pacemaker) Son       "

## 2025-04-21 NOTE — SIGNIFICANT EVENT
Pt returned to [facility] from lab 1, right radial band in place, pt sitting up, eating and drinking, no c/o at this time

## 2025-04-24 ENCOUNTER — HOSPITAL ENCOUNTER (OUTPATIENT)
Dept: RADIOLOGY | Facility: HOSPITAL | Age: 85
End: 2025-04-24
Payer: MEDICARE

## 2025-04-28 NOTE — PROGRESS NOTES
[facility]    Referred by Dr. Patrick for CABG/Valve and Structural Heart Evaluation    Chief Complaint  Cardiac disease assessment    HPI:   Mr. Zackary Shah is an 84 y.o. male, who is a patient of Dr.Henry GLORIA Gregory DO.  I have been asked to see him by Dr. Patrick to evaluate Coronary Artery Disease and Aortic Stenosis.      Zackary Shah has a PMHx significant for Aortic stenosis, Atrial fibrillation, CHF,, Diabetes mellitus, and GERD.  Presents with symptoms of dyspnea on minimal exertion with fatigue and intermittent dizziness.  He underwent [facility] in setting of severe aortic stenosis      Medical History[1]    Surgical History[2]    Family History[3]    Social History     Socioeconomic History    Marital status:      Spouse name: Not on file    Number of children: Not on file    Years of education: Not on file    Highest education level: Not on file   Occupational History    Not on file   Tobacco Use    Smoking status: Former     Types: Cigarettes    Smokeless tobacco: Never   Vaping Use    Vaping status: Not on file   Substance and Sexual Activity    Alcohol use: Not Currently    Drug use: Not Currently    Sexual activity: Defer   Other Topics Concern    Not on file   Social History Narrative    Not on file     Social Drivers of Health     Financial Resource Strain: Low Risk  (1/5/2024)    Overall Financial Resource Strain (CARDIA)     Difficulty of Paying Living Expenses: Not hard at all   Food Insecurity: Not on file   Transportation Needs: No Transportation Needs (1/18/2024)    OASIS : Transportation     Lack of Transportation (Medical): No     Lack of Transportation (Non-Medical): No     Patient Unable or Declines to Respond: No   Physical Activity: Not on file   Stress: Not on file   Social Connections: Feeling Socially Integrated (1/18/2024)    OASIS : Social Isolation     Frequency of experiencing loneliness or isolation: Never   Intimate  Partner Violence: Not on file   Housing Stability: Low Risk  (1/5/2024)    Housing Stability Vital Sign     Unable to Pay for Housing in the Last Year: No     Number of Places Lived in the Last Year: 1     Unstable Housing in the Last Year: No       RX Allergies[4]    Encounter Medications[5]      Physical Exam:   General: no acute distress  CV: regular rate and rhythm  Resp: symmetrical chest rise and fall, no increased work of breathing  Extremities: moving all extremities  Abdomen: soft non tender, non distended      Lab Results   Component Value Date    WBC 10.0 04/21/2025    HGB 15.3 04/21/2025    HCT 47.0 04/21/2025    MCV 99 04/21/2025     04/21/2025     Lab Results   Component Value Date    GLUCOSE 93 04/21/2025    CALCIUM 9.2 04/21/2025     04/21/2025    K 5.1 04/21/2025    CO2 26 04/21/2025     04/21/2025    BUN 24 (H) 04/21/2025    CREATININE 1.45 (H) 04/21/2025         Pertinent Diagnostics:    TTE (3/13/25)  1. The left ventricular systolic function is normal, with a visually estimated ejection fraction of 55-60%.   2. Spectral Doppler shows an abnormal pattern of left ventricular diastolic filling.   3. The left atrium is mildly dilated.   4. Moderate to severe aortic valve stenosis. The aortic valve dimensionless index is 0.23. There is mild aortic valve regurgitation. The peak instantaneous gradient of the aortic valve is 42 mmHg. The mean gradient of the aortic valve is 24 mmHg.   5. There is mild mitral and tricuspid regurgitation.   6. The estimated pulmonary artery pressure is mildly elevated with the RVSP at 48.7 mmHg    Cardiac Catheterization (4/21/25)  1. Left main: 60% distal disease.   2. LAD: mild irregularities, 70% mid-D1 stenosis.   3. LCx: mild irregularities.   4. RCA: 70% mid-vessel stenosis.   5. OL40mcIb, RV 38/6, PA 39/17, PCWP 13mmHg, LVEDP 18mmHg.   6. Lam CO 5.5 l/min, PVR 2.3 [place], SVR 13.9 [place].   7. NADEGE 0.88cm2 by Hakki equation    CT chest (3/21/25)  1.  Stable right lower lobe subpleural ill-defined subsolid pulmonary  nodule measuring 1 cm with halo of surrounding ground-glass density  and another right middle lobe elongated nodule measuring 1 cm. Those  nodules have been stable since 2022 chest CT scan along with other  few subcentimeter pulmonary nodules. Continued follow-up as per  clinically required.  2. Stable bilateral subpleural fibrotic changes and scarring.  3. Aneurysmal dilatation of the ascending thoracic aorta measuring  4.1 cm. Advise cardiac surgery for if not performed previously.  4. Mild cardiomegaly with a trace pericardial effusion. Severe  coronary artery calcifications.  5. Hepatic contour irregularity. Advise screening for underlying  hepatocellular dysfunction/cirrhosis      Assessment and Plan:   Mr. Zackary Shah is an 84 y.o. male who has been referred with Coronary Artery Disease and Aortic Stenosis.      I had a chance to review all available, pertinent investigations.  My interpretation of these studies is as follows:    ***    ____________________________________________________________  Yamini Fernandez MD  Assistant Professor of Surgery  Division of Cardiac Surgery    [facility]  [facility]          [1]   Past Medical History:  Diagnosis Date    Aortic stenosis     Atrial fibrillation (Multi)     CHF (congestive heart failure)     Diabetes mellitus (Multi)     GERD (gastroesophageal reflux disease)    [2]   Past Surgical History:  Procedure Laterality Date    APPENDECTOMY      CARDIAC CATHETERIZATION N/A 4/21/2025    Procedure: Left & Right Heart Cath w Angiography & LV;  Surgeon: Tommie Patrick MD;  Location: [facility];  Service: Cardiovascular;  Laterality: N/A;    CHOLECYSTECTOMY      OTHER SURGICAL HISTORY  10/24/2019    No history of surgery   [3]   Family History  Problem Relation Name Age of Onset    Other (pacemaker) Son     [4]   Allergies  Allergen Reactions     Acetaminophen Unknown     Got sick    Codeine Diarrhea   [5]   Outpatient Encounter Medications as of 5/12/2025   Medication Sig Dispense Refill    acetaminophen (Tylenol) 325 mg tablet Take 2 tablets (650 mg) by mouth every 6 hours if needed for fever (temp greater than 38.0 C), headaches or mild pain (1 - 3). (Patient not taking: Reported on 4/21/2025)      albuterol 90 mcg/actuation inhaler Inhale 2 puffs every 6 hours if needed for wheezing. 18 g 0    furosemide (Lasix) 40 mg tablet Take 1 tablet (40 mg) by mouth once daily.      magnesium oxide (Mag-Ox) 400 mg tablet 1 tablet (400 mg) once daily.      metFORMIN, OSM, (Fortamet) 500 mg 24 hr tablet Take 1 tablet (500 mg) by mouth. Do not crush, chew, or split. (Patient not taking: Reported on 4/21/2025)      metoprolol succinate XL (Toprol-XL) 25 mg 24 hr tablet Take 1 tablet (25 mg) by mouth once daily. Do not crush or chew. 90 tablet 3    metoprolol tartrate (Lopressor) 25 mg tablet Take 1 tablet (25 mg) by mouth once daily.      omeprazole (PriLOSEC) 40 mg DR capsule Take 20 mg by mouth once daily in the morning. Take before meals. Do not crush or chew.      psyllium (Metamucil) 3.4 gram packet Take 1 packet by mouth once daily.      spironolactone (Aldactone) 25 mg tablet Take 1 tablet (25 mg) by mouth once daily. 30 tablet 11    warfarin (Coumadin) 6 mg tablet Take 1 tablet (6 mg) by mouth every other day. Take as directed per After Visit Summary.  According to patient    Patient to follow up with PCP regarding PT/INR and any medication changes.       No facility-administered encounter medications on file as of 5/12/2025.      mouth once daily.      spironolactone (Aldactone) 25 mg tablet Take 1 tablet (25 mg) by mouth once daily. 30 tablet 11    warfarin (Coumadin) 6 mg tablet Take 1 tablet (6 mg) by mouth every other day. Take as directed per After Visit Summary.  According to patient    Patient to follow up with PCP regarding PT/INR and any medication changes.       No facility-administered encounter medications on file as of 5/12/2025.

## 2025-05-05 ENCOUNTER — APPOINTMENT (OUTPATIENT)
Dept: VASCULAR SURGERY | Facility: HOSPITAL | Age: 85
End: 2025-05-05
Payer: MEDICARE

## 2025-05-12 ENCOUNTER — OFFICE VISIT (OUTPATIENT)
Dept: CARDIAC SURGERY | Facility: HOSPITAL | Age: 85
End: 2025-05-12
Payer: MEDICARE

## 2025-05-12 VITALS
DIASTOLIC BLOOD PRESSURE: 66 MMHG | OXYGEN SATURATION: 96 % | BODY MASS INDEX: 32.48 KG/M2 | HEART RATE: 66 BPM | WEIGHT: 232 LBS | HEIGHT: 71 IN | SYSTOLIC BLOOD PRESSURE: 113 MMHG

## 2025-05-12 DIAGNOSIS — I35.0 AORTIC VALVE STENOSIS, ETIOLOGY OF CARDIAC VALVE DISEASE UNSPECIFIED: ICD-10-CM

## 2025-05-12 DIAGNOSIS — R07.9 CHEST PAIN, UNSPECIFIED TYPE: ICD-10-CM

## 2025-05-12 DIAGNOSIS — I25.118 CORONARY ARTERY DISEASE OF NATIVE ARTERY OF NATIVE HEART WITH STABLE ANGINA PECTORIS: ICD-10-CM

## 2025-05-12 DIAGNOSIS — R82.90 ABNORMAL URINE FINDING: ICD-10-CM

## 2025-05-12 DIAGNOSIS — I25.10 ATHEROSCLEROSIS OF NATIVE CORONARY ARTERY, UNSPECIFIED WHETHER ANGINA PRESENT, UNSPECIFIED WHETHER NATIVE OR TRANSPLANTED HEART: Primary | ICD-10-CM

## 2025-05-12 PROCEDURE — 99215 OFFICE O/P EST HI 40 MIN: CPT | Performed by: STUDENT IN AN ORGANIZED HEALTH CARE EDUCATION/TRAINING PROGRAM

## 2025-05-12 PROCEDURE — 99205 OFFICE O/P NEW HI 60 MIN: CPT | Performed by: STUDENT IN AN ORGANIZED HEALTH CARE EDUCATION/TRAINING PROGRAM

## 2025-07-08 ENCOUNTER — APPOINTMENT (OUTPATIENT)
Dept: RADIOLOGY | Facility: HOSPITAL | Age: 85
End: 2025-07-08
Payer: MEDICARE

## 2025-07-14 ENCOUNTER — HOSPITAL ENCOUNTER (OUTPATIENT)
Dept: RADIOLOGY | Facility: HOSPITAL | Age: 85
Discharge: HOME | End: 2025-07-14
Payer: MEDICARE

## 2025-07-14 DIAGNOSIS — R93.2 ABNORMAL FINDINGS ON DIAGNOSTIC IMAGING OF LIVER AND BILIARY TRACT: ICD-10-CM

## 2025-07-14 LAB
CREAT SERPL-MCNC: 1.59 MG/DL (ref 0.6–1.3)
GFR SERPL CREATININE-BSD FRML MDRD: 43 ML/MIN/1.73M*2

## 2025-07-14 PROCEDURE — 74177 CT ABD & PELVIS W/CONTRAST: CPT

## 2025-07-14 PROCEDURE — 82565 ASSAY OF CREATININE: CPT

## 2025-07-14 PROCEDURE — 2550000001 HC RX 255 CONTRASTS

## 2025-07-14 RX ADMIN — IOHEXOL 75 ML: 350 INJECTION, SOLUTION INTRAVENOUS at 12:58

## 2025-09-02 DIAGNOSIS — I50.9 DECOMPENSATED HEART FAILURE: ICD-10-CM

## 2025-09-03 RX ORDER — SPIRONOLACTONE 25 MG/1
25 TABLET ORAL DAILY
Qty: 90 TABLET | Refills: 3 | Status: SHIPPED | OUTPATIENT
Start: 2025-09-03 | End: 2026-09-03